# Patient Record
Sex: MALE | Employment: OTHER | URBAN - METROPOLITAN AREA
[De-identification: names, ages, dates, MRNs, and addresses within clinical notes are randomized per-mention and may not be internally consistent; named-entity substitution may affect disease eponyms.]

---

## 2017-07-13 ENCOUNTER — OFFICE VISIT (OUTPATIENT)
Dept: FAMILY MEDICINE | Facility: CLINIC | Age: 62
End: 2017-07-13

## 2017-07-13 VITALS
OXYGEN SATURATION: 99 % | SYSTOLIC BLOOD PRESSURE: 125 MMHG | HEIGHT: 69 IN | HEART RATE: 55 BPM | DIASTOLIC BLOOD PRESSURE: 80 MMHG | WEIGHT: 167.04 LBS | BODY MASS INDEX: 24.74 KG/M2 | TEMPERATURE: 97.4 F

## 2017-07-13 DIAGNOSIS — Z12.5 SCREENING FOR PROSTATE CANCER: ICD-10-CM

## 2017-07-13 DIAGNOSIS — E78.5 HYPERLIPIDEMIA LDL GOAL <100: ICD-10-CM

## 2017-07-13 DIAGNOSIS — Z12.11 SCREENING FOR COLON CANCER: ICD-10-CM

## 2017-07-13 DIAGNOSIS — M67.472 GANGLION CYST OF LEFT FOOT: Primary | ICD-10-CM

## 2017-07-13 LAB
ALT SERPL W P-5'-P-CCNC: 24 U/L (ref 0–70)
AST SERPL W P-5'-P-CCNC: 16 U/L (ref 0–45)
CHOLEST SERPL-MCNC: 176 MG/DL
GLUCOSE SERPL-MCNC: 95 MG/DL (ref 70–99)
HDLC SERPL-MCNC: 51 MG/DL
LDLC SERPL CALC-MCNC: 92 MG/DL
NONHDLC SERPL-MCNC: 125 MG/DL
PSA SERPL-ACNC: 3.52 UG/L (ref 0–4)
TRIGL SERPL-MCNC: 166 MG/DL

## 2017-07-13 ASSESSMENT — PAIN SCALES - GENERAL: PAINLEVEL: NO PAIN (0)

## 2017-07-13 NOTE — MR AVS SNAPSHOT
After Visit Summary   7/13/2017    Donald Torres    MRN: [id number]           Patient Information     Date Of Birth          1955        Visit Information        Provider Department      7/13/2017 11:00 AM Calderon Wilkinson MD [facility]        Today's Diagnoses     Ganglion cyst of left foot    -  1    Hyperlipidemia LDL goal <100        Screening for colon cancer        Screening for prostate cancer           Follow-ups after your visit        Future tests that were ordered for you today     Open Future Orders        Priority Expected Expires Ordered    Fecal colorectal cancer screen FIT Routine 8/3/2017 10/5/2017 7/13/2017            Who to contact     Please call your clinic at [phone] to:    Ask questions about your health    Make or cancel appointments    Discuss your medicines    Learn about your test results    Speak to your doctor   If you have compliments or concerns about an experience at your clinic, or if you wish to file a complaint, please contact [facility] Patient Relations at [phone] or email us at [email or web address]         Additional Information About Your Visit        MyChart Information     [employer] gives you secure access to your electronic health record. If you see a primary care provider, you can also send messages to your care team and make appointments. If you have questions, please call your primary care clinic.  If you do not have a primary care provider, please call [phone] and they will assist you.      [employer] is an electronic gateway that provides easy, online access to your medical records. With [employer], you can request a clinic appointment, read your test results, renew a prescription or communicate with your care team.     To access your existing account, please contact your [facility] or call [phone] for assistance.        Care EveryWhere ID     This is your  "Care EveryWhere ID. This could be used by other organizations to access your [place] medical records  [id number]        Your Vitals Were     Pulse Temperature Height Pulse Oximetry BMI (Body Mass Index)       55 97.4  F (36.3  C) 5' 8.5\" (174 cm) 99% 25.03 kg/m2        Blood Pressure from Last 3 Encounters:   07/13/17 125/80   02/25/16 125/79   10/01/15 122/76    Weight from Last 3 Encounters:   07/13/17 167 lb 0.6 oz (75.8 kg)   02/25/16 172 lb (78 kg)   10/01/15 168 lb (76.2 kg)              We Performed the Following     Lipid Panel Plus ([place])        Primary Care Provider Office Phone # Fax #    Calderon Wilkinson [phone]       [address] [address]        Equal Access to Services     SENAIT MURRIETA : Hadii aad ku hadasho Sobrianna, waaxda luqadaha, qaybta kaalmada adeegyada, yanira britoin mojgan mckenzie . So [facility] [phone].    ATENCIÓN: Si habla español, tiene a kramer disposición servicios gratuitos de asistencia lingüística. Robby al [phone].    We comply with applicable federal civil rights laws and [place] laws. We do not discriminate on the basis of race, color, national origin, age, disability sex, sexual orientation or gender identity.            Thank you!     Thank you for choosing [facility]  for your care. Our goal is always to provide you with excellent care. Hearing back from our patients is one way we can continue to improve our services. Please take a few minutes to complete the written survey that you may receive in the mail after your visit with us. Thank you!             Your Updated Medication List - Protect others around you: Learn how to safely use, store and throw away your medicines at www.disposemymeds.org.          This list is accurate as of: 7/13/17 11:23 AM.  Always use your most recent med list.                   Brand Name Dispense Instructions for use Diagnosis    atorvastatin 10 MG tablet    " LIPITOR    90 tablet    Take 1 tablet (10 mg) by mouth At Bedtime    Hyperlipidemia LDL goal <100       guaiFENesin-codeine 100-10 MG/5ML Soln solution    ROBITUSSIN AC    180 mL    Take 5-10 mLs by mouth every 4 hours as needed for cough    Cough       MUCINEX ALLERGY PO           ZANTAC PO

## 2017-07-13 NOTE — NURSING NOTE
"62 year old  Chief Complaint   Patient presents with     Mass     report a bump underneath the pt's left foot x5-6 weeks.       Blood pressure 125/80, pulse 55, temperature 97.4  F (36.3  C), height 5' 8.5\" (174 cm), weight 167 lb 0.6 oz (75.8 kg), SpO2 99 %. Body mass index is 25.03 kg/(m^2).  Patient Active Problem List   Diagnosis     Hyperlipidemia LDL goal <100     Esophageal reflux     Post-nasal drainage       Wt Readings from Last 2 Encounters:   07/13/17 167 lb 0.6 oz (75.8 kg)   02/25/16 172 lb (78 kg)     BP Readings from Last 3 Encounters:   07/13/17 125/80   02/25/16 125/79   10/01/15 122/76         Current Outpatient Prescriptions   Medication     atorvastatin (LIPITOR) 10 MG tablet     Fexofenadine HCl (MUCINEX ALLERGY PO)     guaiFENesin-codeine (ROBITUSSIN AC) 100-10 MG/5ML SOLN     Ranitidine HCl (ZANTAC PO)     No current facility-administered medications for this visit.        Social History   Substance Use Topics     Smoking status: Never Smoker     Smokeless tobacco: Not on file     Alcohol use Yes      Comment: wine couple times per week       Health Maintenance Due   Topic Date Due     ADVANCE DIRECTIVE PLANNING Q5 YRS  04/29/1972     FLEX SIG Q5 YEARS  01/25/2015       No results found for: LILLIAM Colon CMA  July 13, 2017 11:04 AM  "

## 2017-07-19 NOTE — PROGRESS NOTES
CHIEF COMPLAINT:  Mass on foot.      HISTORY OF PRESENT ILLNESS:  Donald Torres is 62-year-old accompanied by his wife, Leslie, to today's visit.  He has noticed a small bump under his left foot that has been present for about 5 to 6 weeks.  The only reason he noticed it is that he was rubbing his foot as he often does.  He simply noticed it and it was not due to pain.  Indeed, since he discovered it, he has had absolutely no discomfort whatsoever.  It does not seem to be enlarging.  He is wearing sandals and walking and such footwear does not cause any discomfort.  He has had plantar fasciitis in the past and this does not feel like it.  He is not sure what it is and just wanted to bring it to my attention.      Since he is in, he would like to talk about getting colon cancer screening.  He is 62 years old and has never had a colonoscopy.  We weighed the pros and cons of doing that versus something like a FIT card.  For a number of reasons, he would like to go with the latter.  In addition, he is fasting and wonders he could go ahead and check a lipid panel today (he is on atorvastatin) and I am happy to do that.  In addition, we will check a PSA 50, a glucose, and liver function tests.  Active medical problems reviewed.  Current medications reviewed.    OBJECTIVE:  Josep is in absolutely no distress.  BP is 125/80 with a pulse of 55 and regular.  Temperature is 97.4.  He is 5 feet 8-1/2 inches in height and weighs 167 pounds with a BMI of 25.  O2 sats are 99% on room air.  Examination of the bottom of the left foot reveals a small cyst-like mass over one of the plantar flexor tendons.  It is mobile.  It is not composed of bone.  It feels very benign.  I cannot reproduce any pain by palpating it.      Labs are pending.    ASSESSMENT AND PLAN:    1.  Probable ganglion cyst on the bottom of the left foot.  It causes no pain.  At this point, we will simply watch and wait.  However, if it increases in size and/or  causes discomfort, he will simply let me know.  At that point, somewhat dependent on size, we can decide if we should go with an ultrasound to determine if it is solid or fluid or go right to an MRI to further characterize it.  I am not concerned at this point that it is a sarcoma given the various features described above.  However, we will simply be aware of this and we will follow up accordingly.  2.  FIT card given today for colon cancer screening purposes.  If positive, he will need to get a colonoscopy.  If negative, we will simply repeat in 1 year.    3.  Lipid panel plus obtained today for lipids, glucose, and liver function tests.  He will be notified of those results.    4.  PSA 50 drawn today for prostate cancer screening purposes.      He will call with any problems or questions.          Calderon Wilkinson MD    D:  07/13/2017 18:30 T:  07/18/2017 21:26  Document:  [id number] \DK\BR

## 2017-08-29 DIAGNOSIS — Z12.11 SCREENING FOR COLON CANCER: ICD-10-CM

## 2017-08-29 LAB — HEMOCCULT STL QL IA: NEGATIVE

## 2017-08-29 PROCEDURE — 82274 ASSAY TEST FOR BLOOD FECAL: CPT | Performed by: FAMILY MEDICINE

## 2017-12-15 DIAGNOSIS — E78.5 HYPERLIPIDEMIA LDL GOAL <100: ICD-10-CM

## 2017-12-15 RX ORDER — ATORVASTATIN CALCIUM 10 MG/1
10 TABLET, FILM COATED ORAL AT BEDTIME
Qty: 90 TABLET | Refills: 1 | Status: SHIPPED | OUTPATIENT
Start: 2017-12-15 | End: 2018-06-18

## 2018-06-18 DIAGNOSIS — E78.5 HYPERLIPIDEMIA LDL GOAL <100: ICD-10-CM

## 2018-06-18 RX ORDER — ATORVASTATIN CALCIUM 10 MG/1
10 TABLET, FILM COATED ORAL AT BEDTIME
Qty: 90 TABLET | Refills: 0 | Status: SHIPPED | OUTPATIENT
Start: 2018-06-18 | End: 2018-09-10

## 2018-06-18 NOTE — TELEPHONE ENCOUNTER
Medication is being filled for 1 time refill only due to:  Patient needs labs lipid panel plus. Future labs ordered yes. Patient needs to be seen because it has been more than one year since last visit. come July 13, 2018.    Fariha Cotter RN  June 18, 2018 3:17 PM

## 2018-09-10 DIAGNOSIS — E78.5 HYPERLIPIDEMIA LDL GOAL <100: ICD-10-CM

## 2018-09-10 RX ORDER — ATORVASTATIN CALCIUM 10 MG/1
10 TABLET, FILM COATED ORAL AT BEDTIME
Qty: 30 TABLET | Refills: 0 | Status: SHIPPED | OUTPATIENT
Start: 2018-09-10 | End: 2018-10-16

## 2018-09-10 NOTE — TELEPHONE ENCOUNTER
Last office visit: 07/13/2017, no future appointment. Needs follow-up for lab. and doctor visit.    Routing refill request to provider for review/approval because:  Felicitas given x1 and patient did not follow up, please advise  Labs not current:  Lipid panel plus, future placed   Patient needs to be seen because it has been more than 1 year since last office visit.    Fariha Cotter RN  September 10, 2018 11:53 AM

## 2018-10-16 DIAGNOSIS — E78.5 HYPERLIPIDEMIA LDL GOAL <100: ICD-10-CM

## 2018-10-16 RX ORDER — ATORVASTATIN CALCIUM 10 MG/1
10 TABLET, FILM COATED ORAL AT BEDTIME
Qty: 15 TABLET | Refills: 0 | Status: SHIPPED | OUTPATIENT
Start: 2018-10-16 | End: 2018-10-23 | Stop reason: DRUGHIGH

## 2018-10-16 NOTE — TELEPHONE ENCOUNTER
Last office visit:  07/13/2017, no future appointment.  Needs follow-up appointment.Final Refill.  Medication is being filled for 1 time refill only due to:  Patient needs labs lipid plus - already in chart. Patient needs to be seen because it has been more than one year since last visit.   Nelsy Rain RN  Care Coordinator  [facility]

## 2018-10-18 DIAGNOSIS — E78.5 HYPERLIPIDEMIA LDL GOAL <100: ICD-10-CM

## 2018-10-18 LAB
ALT SERPL W P-5'-P-CCNC: 27 U/L (ref 0–70)
AST SERPL W P-5'-P-CCNC: 17 U/L (ref 0–45)
CHOLEST SERPL-MCNC: 214 MG/DL
GLUCOSE SERPL-MCNC: 87 MG/DL (ref 70–99)
HDLC SERPL-MCNC: 61 MG/DL
LDLC SERPL CALC-MCNC: 126 MG/DL
NONHDLC SERPL-MCNC: 153 MG/DL
TRIGL SERPL-MCNC: 137 MG/DL

## 2018-10-23 DIAGNOSIS — E78.5 HYPERLIPIDEMIA LDL GOAL <100: ICD-10-CM

## 2018-10-23 RX ORDER — ATORVASTATIN CALCIUM 20 MG/1
20 TABLET, FILM COATED ORAL DAILY
Qty: 90 TABLET | Refills: 1 | Status: SHIPPED | OUTPATIENT
Start: 2018-10-23 | End: 2019-04-04

## 2018-10-23 NOTE — TELEPHONE ENCOUNTER
Per Jaja Hernandez,  Future lab placed and med changed to 20 mg atorvastatin,  Pt is to RTN for visit with Jaja in 2 months time and then also get repeat lipid panel then  \  Mycdeepted pt back all this info.     Fariha Cotter RN  October 23, 2018 9:41 AM

## 2018-10-29 ENCOUNTER — OFFICE VISIT (OUTPATIENT)
Dept: FAMILY MEDICINE | Facility: CLINIC | Age: 63
End: 2018-10-29
Payer: COMMERCIAL

## 2018-10-29 VITALS
TEMPERATURE: 97.5 F | SYSTOLIC BLOOD PRESSURE: 130 MMHG | HEIGHT: 68 IN | OXYGEN SATURATION: 99 % | BODY MASS INDEX: 25.27 KG/M2 | DIASTOLIC BLOOD PRESSURE: 77 MMHG | HEART RATE: 58 BPM | WEIGHT: 166.75 LBS

## 2018-10-29 DIAGNOSIS — I49.3 PVC'S (PREMATURE VENTRICULAR CONTRACTIONS): ICD-10-CM

## 2018-10-29 DIAGNOSIS — R00.2 PALPITATIONS: Primary | ICD-10-CM

## 2018-10-29 DIAGNOSIS — F51.02 ADJUSTMENT INSOMNIA: ICD-10-CM

## 2018-10-29 DIAGNOSIS — F40.248 FEAR OF PUBLIC SPEAKING: ICD-10-CM

## 2018-10-29 LAB
ANION GAP SERPL CALCULATED.3IONS-SCNC: 8 MMOL/L (ref 3–14)
BUN SERPL-MCNC: 13 MG/DL (ref 7–30)
CALCIUM SERPL-MCNC: 9.4 MG/DL (ref 8.5–10.1)
CHLORIDE SERPL-SCNC: 108 MMOL/L (ref 94–109)
CO2 SERPL-SCNC: 26 MMOL/L (ref 20–32)
CREAT SERPL-MCNC: 0.87 MG/DL (ref 0.66–1.25)
GFR SERPL CREATININE-BSD FRML MDRD: 88 ML/MIN/1.7M2
GLUCOSE SERPL-MCNC: 99 MG/DL (ref 70–99)
POTASSIUM SERPL-SCNC: 4.4 MMOL/L (ref 3.4–5.3)
SODIUM SERPL-SCNC: 142 MMOL/L (ref 133–144)
TSH SERPL DL<=0.005 MIU/L-ACNC: 1.85 MU/L (ref 0.4–4)

## 2018-10-29 RX ORDER — PROPRANOLOL HYDROCHLORIDE 10 MG/1
TABLET ORAL
Qty: 30 TABLET | Refills: 1 | Status: SHIPPED | OUTPATIENT
Start: 2018-10-29 | End: 2022-02-01

## 2018-10-29 RX ORDER — LORAZEPAM 0.5 MG/1
TABLET ORAL
Qty: 20 TABLET | Refills: 1 | Status: SHIPPED | OUTPATIENT
Start: 2018-10-29 | End: 2018-12-10

## 2018-10-29 NOTE — NURSING NOTE
"63 year old  Chief Complaint   Patient presents with     Heart Problem     pt reports noticing some irregular heart beats for the past few months       Blood pressure 130/77, pulse 58, temperature 97.5  F (36.4  C), temperature source Oral, height 5' 8.42\" (173.8 cm), weight 166 lb 12 oz (75.6 kg), SpO2 99 %. Body mass index is 25.04 kg/(m^2).  Patient Active Problem List   Diagnosis     Hyperlipidemia LDL goal <100     Esophageal reflux     Post-nasal drainage     Ganglion cyst of left foot       Wt Readings from Last 2 Encounters:   10/29/18 166 lb 12 oz (75.6 kg)   07/13/17 167 lb 0.6 oz (75.8 kg)     BP Readings from Last 3 Encounters:   10/29/18 130/77   07/13/17 125/80   02/25/16 125/79         Current Outpatient Prescriptions   Medication     ASPIRIN PO     atorvastatin (LIPITOR) 20 MG tablet     Calcium Carbonate (CALCIUM 600 PO)     doxylamine (UNISOM) 25 MG TABS tablet     Fexofenadine HCl (MUCINEX ALLERGY PO)     Glucosamine HCl (GLUCOSAMINE PO)     MELATONIN PO     Ranitidine HCl (ZANTAC PO)     guaiFENesin-codeine (ROBITUSSIN AC) 100-10 MG/5ML SOLN     No current facility-administered medications for this visit.        Social History   Substance Use Topics     Smoking status: Never Smoker     Smokeless tobacco: Not on file     Alcohol use Yes      Comment: wine couple times per week       Health Maintenance Due   Topic Date Due     HIV SCREEN (SYSTEM ASSIGNED)  04/29/1972     ADVANCE DIRECTIVE PLANNING Q5 YRS  04/29/2009     FLEX SIG Q5 YEARS  01/25/2015     PHQ-2 Q1 YR  07/13/2018     INFLUENZA VACCINE (1) 09/01/2018       No results found for: PAP      October 29, 2018 7:59 AM  "

## 2018-10-29 NOTE — PROGRESS NOTES
"CHIEF COMPLAINT:  Heart palpitations      HISTORY OF PRESENT ILLNESS: Donald Torres is a 63 year old male who presents with his wife for above. He reports that he has been having heart palpitations in the past several months. He describes feeling a \"pause\" in his heartbeat when laying down to go to bed. He also felt the palpitations recently while sitting on the couch, and he was feeling slightly SOB. His wife checked his pulse and reports that it was regularly irregular. He has a family history of CAD (uncles) and a fib (brother and sister), so the heart palpitations make him nervous. He reports that he has been more stressed recently, largely due to difficulty sleeping.  He rarely drinks coffee or other caffeine. Nonsmoker. He exercises regularly, and does not notice the palpitations during activity. He takes atorvastatin 20mg for hyperlipidemia. His last lipid panel was on 10/18/18, and his LDL was elevated at 126. He also takes aspirin 81mg daily. He has heartburn, which is well-controlled with ranitidine daily.    He reports that when he feels particularly anxious, he has some difficulty falling asleep. 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 He reports that he has been awake until 3am as a result. He is wondering if taking something like melatonin would help with his sleep. His wife reports that they have some stressful changes in their lives as well.      FAMILY, SOCIAL AND PAST SURGICAL HISTORIES:  Unchanged.       MEDICATIONS:  Carefully reviewed.       REVIEW OF SYSTEMS:  A 10-point review is negative except as otherwise noted.      OBJECTIVE:    GENERAL: Donald is in no distress.  Affect is upbeat.     VITAL SIGNS: /77  Pulse 58  Temp 97.5  F (36.4  C) (Oral)  Ht 5' 8.42\" (173.8 cm)  Wt 166 lb 12 oz (75.6 kg)  SpO2 99%  BMI 25.04 kg/m2  HEENT:  Head is normocephalic, atraumatic.  HEART:  Regular rate and rhythm without murmur.   EXTREMITIES: "  Ankles free of any edema.   SKIN:  Warm and dry.       LABORATORY DATA: Labs (BMP and TSH) pending  EKG: NSR without ectopy.  1 minute rhythm strip revealed one isolated PVC.      ASSESSMENT AND PLAN:   1. Heart palpitations: EKG with rhythm strip showed 1 PVC.  TSH and basic metabolic panel pending  2. Primary insomnia: Prescribed lorazepam 0.5mg, 1-2 at HS prn insomnia/anxiety  3. 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  4. We'll see how he does over the next 2-3 weeks.  If palpitations continue, then we'll obtain a 7-day Zio Patch.  5. Total time spent with patient and his wife: over 25 minutes.     Call with any problems or questions.          I, Priyanka Colvin, am serving as a scribe to document services personally performed by Dr. Calderon Wilkinson, based on data collection and the provider's statements to me.

## 2018-10-29 NOTE — MR AVS SNAPSHOT
"              After Visit Summary   10/29/2018    Donald Torres    MRN: [id number]           Patient Information     Date Of Birth          1955        Visit Information        Provider Department      10/29/2018 8:00 AM Calderon Wilkinson MD [facility]        Today's Diagnoses     Palpitations    -  1    Adjustment insomnia        Fear of public speaking        PVC's (premature ventricular contractions)           Follow-ups after your visit        Follow-up notes from your care team     Return if symptoms worsen or fail to improve.      Who to contact     Please call your clinic at [phone] to:    Ask questions about your health    Make or cancel appointments    Discuss your medicines    Learn about your test results    Speak to your doctor            Additional Information About Your Visit        [employer] Information     [employer] gives you secure access to your electronic health record. If you see a primary care provider, you can also send messages to your care team and make appointments. If you have questions, please call your primary care clinic.  If you do not have a primary care provider, please call [phone] and they will assist you.      [employer] is an electronic gateway that provides easy, online access to your medical records. With [employer], you can request a clinic appointment, read your test results, renew a prescription or communicate with your care team.     To access your existing account, please contact your [facility] or call [phone] for assistance.        Care EveryWhere ID     This is your Care EveryWhere ID. This could be used by other organizations to access your [place] medical records  [id number]        Your Vitals Were     Pulse Temperature Height Pulse Oximetry BMI (Body Mass Index)       58 97.5  F (36.4  C) (Oral) 5' 8.42\" (173.8 cm) 99% 25.04 kg/m2        Blood Pressure from Last 3 Encounters:   10/29/18 130/77   07/13/17 125/80 "   02/25/16 125/79    Weight from Last 3 Encounters:   10/29/18 166 lb 12 oz (75.6 kg)   07/13/17 167 lb 0.6 oz (75.8 kg)   02/25/16 172 lb (78 kg)              We Performed the Following     Basic metabolic panel     EKG 12-lead complete w/read - Clinics     EKG rhythm strip     TSH with free T4 reflex          Today's Medication Changes          These changes are accurate as of 10/29/18  6:50 PM.  If you have any questions, ask your nurse or doctor.               Start taking these medicines.        Dose/Directions    LORazepam 0.5 MG tablet   Commonly known as:  ATIVAN   Used for:  Adjustment insomnia        Take 1-2 po at hs prn insomnia   Quantity:  20 tablet   Refills:  1       propranolol 10 MG tablet   Commonly known as:  INDERAL   Used for:  Fear of public speaking        Take  1 po ~30-60 minutes prior to public speaking   Quantity:  30 tablet   Refills:  1            Where to get your medicines      These medications were sent to [id number] - [place], [place] - [address] AT [facility] [address]  [facility] [address], [place] [address]    Hours:  24-hours Phone:  [phone]     propranolol 10 MG tablet         Some of these will need a paper prescription and others can be bought over the counter.  Ask your nurse if you have questions.     Bring a paper prescription for each of these medications     LORazepam 0.5 MG tablet                Primary Care Provider Office Phone # Fax #    Calderon Wilkinson [phone]       [address] [place] [address]        Equal Access to Services     [facility] LIT AH: Hadii herber keys hadasho Soomaali, waaxda luqadaha, qaybta kaalmada adeegyada, waxay idinorman longo. So [facility] [phone].    ATENCIÓN: Si habla español, tiene a kramer disposición servicios gratuitos de asistencia lingüística. Llame al [phone].    We comply with applicable federal civil rights laws and [place] laws. We do not  discriminate on the basis of race, color, national origin, age, disability, sex, sexual orientation, or gender identity.            Thank you!     Thank you for choosing [facility]  for your care. Our goal is always to provide you with excellent care. Hearing back from our patients is one way we can continue to improve our services. Please take a few minutes to complete the written survey that you may receive in the mail after your visit with us. Thank you!             Your Updated Medication List - Protect others around you: Learn how to safely use, store and throw away your medicines at www.disposemymeds.org.          This list is accurate as of 10/29/18  6:50 PM.  Always use your most recent med list.                   Brand Name Dispense Instructions for use Diagnosis    ASPIRIN PO      Take 81 mg by mouth        atorvastatin 20 MG tablet    LIPITOR    90 tablet    Take 1 tablet (20 mg) by mouth daily    Hyperlipidemia LDL goal <100       CALCIUM 600 PO      Take 600 Units by mouth 2 times daily        doxylamine 25 MG Tabs tablet    UNISOM     Take 25 mg by mouth At Bedtime        GLUCOSAMINE PO           guaiFENesin-codeine 100-10 MG/5ML Soln solution    ROBITUSSIN AC    180 mL    Take 5-10 mLs by mouth every 4 hours as needed for cough    Cough       LORazepam 0.5 MG tablet    ATIVAN    20 tablet    Take 1-2 po at hs prn insomnia    Adjustment insomnia       MELATONIN PO      Take 3 mg by mouth At Bedtime        MUCINEX ALLERGY PO           propranolol 10 MG tablet    INDERAL    30 tablet    Take  1 po ~30-60 minutes prior to public speaking    Fear of public speaking       ZANTAC PO

## 2018-12-10 DIAGNOSIS — F51.02 ADJUSTMENT INSOMNIA: ICD-10-CM

## 2018-12-10 RX ORDER — LORAZEPAM 0.5 MG/1
TABLET ORAL
Qty: 20 TABLET | Refills: 1
Start: 2018-12-10 | End: 2019-02-07

## 2019-04-01 ENCOUNTER — MYC MEDICAL ADVICE (OUTPATIENT)
Dept: FAMILY MEDICINE | Facility: CLINIC | Age: 64
End: 2019-04-01

## 2019-04-01 DIAGNOSIS — F51.02 ADJUSTMENT INSOMNIA: ICD-10-CM

## 2019-04-01 RX ORDER — LORAZEPAM 0.5 MG/1
TABLET ORAL
Qty: 20 TABLET | Refills: 0
Start: 2019-04-01 | End: 2019-04-24

## 2019-04-01 NOTE — TELEPHONE ENCOUNTER
Last Office Visit: 10/29/18  Future [facility] Appointments: none  Medication last refilled: 2/8/19 #20 + 1 RF    Patient going on vacation for 1 month, will need this for the duration of his trip.     Routing refill request to provider for review/approval because: Drug not on the [facility] [place] protocol   Radha Fan RN  04/01/19  4:52 PM

## 2019-04-04 ENCOUNTER — MYC MEDICAL ADVICE (OUTPATIENT)
Dept: FAMILY MEDICINE | Facility: CLINIC | Age: 64
End: 2019-04-04

## 2019-04-04 DIAGNOSIS — E78.5 HYPERLIPIDEMIA LDL GOAL <100: ICD-10-CM

## 2019-04-04 RX ORDER — ATORVASTATIN CALCIUM 20 MG/1
20 TABLET, FILM COATED ORAL DAILY
Qty: 90 TABLET | Refills: 0 | Status: SHIPPED | OUTPATIENT
Start: 2019-04-04 | End: 2019-07-16

## 2019-04-04 NOTE — TELEPHONE ENCOUNTER
OK per Dr. Wilkinson.    Patient will be due for return for lipid panel, increased dosing after last lipid panel    Radha Fan RN  04/04/19  12:29 PM

## 2019-04-24 DIAGNOSIS — F51.02 ADJUSTMENT INSOMNIA: ICD-10-CM

## 2019-04-24 RX ORDER — LORAZEPAM 0.5 MG/1
TABLET ORAL
Qty: 8 TABLET | Refills: 0
Start: 2019-04-24 | End: 2019-07-16

## 2019-04-24 NOTE — TELEPHONE ENCOUNTER
Last refill 4/1/19 - pt in [place] and asking for 8 more tabs to be called to [facility] in [facility] per Dr Wilkinson.  Nelsy Rain RN  [facility]

## 2019-04-25 NOTE — TELEPHONE ENCOUNTER
Script for lorazepam phoned to [facility] [phone].  Nelsy Rain RN  [facility]

## 2019-04-29 ENCOUNTER — OFFICE VISIT (OUTPATIENT)
Dept: FAMILY MEDICINE | Facility: CLINIC | Age: 64
End: 2019-04-29
Payer: COMMERCIAL

## 2019-04-29 ENCOUNTER — TELEPHONE (OUTPATIENT)
Dept: FAMILY MEDICINE | Facility: CLINIC | Age: 64
End: 2019-04-29

## 2019-04-29 VITALS
OXYGEN SATURATION: 97 % | DIASTOLIC BLOOD PRESSURE: 81 MMHG | SYSTOLIC BLOOD PRESSURE: 143 MMHG | WEIGHT: 162.25 LBS | RESPIRATION RATE: 16 BRPM | BODY MASS INDEX: 24.36 KG/M2 | TEMPERATURE: 97.7 F | HEART RATE: 68 BPM

## 2019-04-29 DIAGNOSIS — G47.00 INSOMNIA, UNSPECIFIED TYPE: ICD-10-CM

## 2019-04-29 DIAGNOSIS — F41.9 ANXIETY DISORDER, UNSPECIFIED TYPE: Primary | ICD-10-CM

## 2019-04-29 RX ORDER — LORAZEPAM 0.5 MG/1
0.5 TABLET ORAL EVERY 8 HOURS PRN
Qty: 9 TABLET | Refills: 0 | Status: SHIPPED | OUTPATIENT
Start: 2019-04-29 | End: 2019-07-16

## 2019-04-29 RX ORDER — DOXEPIN HYDROCHLORIDE 10 MG/1
10 CAPSULE ORAL AT BEDTIME
Qty: 30 CAPSULE | Refills: 1 | Status: SHIPPED | OUTPATIENT
Start: 2019-04-29 | End: 2019-07-15

## 2019-04-29 ASSESSMENT — ANXIETY QUESTIONNAIRES
5. BEING SO RESTLESS THAT IT IS HARD TO SIT STILL: MORE THAN HALF THE DAYS
6. BECOMING EASILY ANNOYED OR IRRITABLE: MORE THAN HALF THE DAYS
1. FEELING NERVOUS, ANXIOUS, OR ON EDGE: NEARLY EVERY DAY
GAD7 TOTAL SCORE: 17
3. WORRYING TOO MUCH ABOUT DIFFERENT THINGS: MORE THAN HALF THE DAYS
7. FEELING AFRAID AS IF SOMETHING AWFUL MIGHT HAPPEN: MORE THAN HALF THE DAYS
2. NOT BEING ABLE TO STOP OR CONTROL WORRYING: NEARLY EVERY DAY
IF YOU CHECKED OFF ANY PROBLEMS ON THIS QUESTIONNAIRE, HOW DIFFICULT HAVE THESE PROBLEMS MADE IT FOR YOU TO DO YOUR WORK, TAKE CARE OF THINGS AT HOME, OR GET ALONG WITH OTHER PEOPLE: VERY DIFFICULT

## 2019-04-29 ASSESSMENT — PATIENT HEALTH QUESTIONNAIRE - PHQ9: 5. POOR APPETITE OR OVEREATING: NEARLY EVERY DAY

## 2019-04-29 NOTE — PROGRESS NOTES
"  SUBJECTIVE:   Donald Torres is a 64 year old male who presents to clinic today for a return visit.    Recent Psychiatric History:  - reports he has struggled with anxiety his entire life  - retired a month ago  - went on a 3 week road trip with his wife but noticed he has having a harder and harder time \"dealing with stuff\" and with sleep  - was prescribed Ativan for anxiety at bedtime about 6 months by PCP  - made a huge difference, cutting out restlessness and helped with sleep  - last dose of Ativan was the evening of the   - worsen anxiety and panic attacks since last night (the )    Previous Diagnoses:  None    Current Psychiatric Medications:  Lorazepam 0.5mg #20/month prescribed by PCP Dr. Wilkinson since 2018  Propranolol 10mg PRN for speaking anxiety  Usually takes 50mg diphenhydramine and melatonin at night as well    Previous Medications:  None    Depressive Symptoms:  Over the last 2 weeks, how often have you been bothered by any of the followin = Not at all  1 = Several Days  2= More than half the days  3= Nearly every day    Little interest or pleasure in doing things: 1  Feeling down, depressed, or hopeless: 2  Trouble falling or staying asleep, or sleeping too much: 3  Feeling tired or having little energy: 1  Poor appetite or overeatin  Feeling bad about yourself or that you are a failure or have let yourself or your family down: 2  Trouble concentrating on things, such as reading the newspaper or watching television: 1  Moving or speaking so slowly that other people could have noticed or being so figety or restless that you have been moving around a lot more than usual: 2  Thoughts that you would be better off dead or of hurting yourself: 0  Total score: 15    Anxiety Symptoms:    Each item scored 0-3 (0=\"not at all,\" 1=\"several days,\" 2=\"more than half the days,\" 3=\"nearly every day)  Over the last 2 weeks how often have you been bothered by the following problems?  1) " Feeling nervous, anxious, or on edge? 3  2) Not being able to stop or control worrying? 3  3) Worrying too much about different things? 2  4) Trouble relaxing? 3  5) Being so restless that it is hard to sit still? 2  6) Becoming easily annoyed or irritable? 2  7) Feeling afraid as if something awful might happen? 2  Total Score: 17    - Intrusive thoughts, images, urges: no  - Repetitive behaviors or mental rituals: no    - Are anxiety symptoms predominantly about one or more social situations in which the individual is exposed to possible scrutiny by others. Examples include social interactions (eg, having a conversation, meeting unfamiliar people), being observed (eg, eating or drinking), and performing in front of others (eg, giving a speech): no but does have increased anxiety around public speaking    - Is there a marked fear or anxiety about two (or more) of the following five situations:   1. Using public transportation (e.g., automobiles, buses, trains, ships, planes): no  2. Being in open spaces (e.g., parking lots, marketplaces, bridges): no  3. Being in enclosed places (e.g., shops, theaters, cinemas): no  4. Standing in line or being in a crowd: no  5. Being outside of the home alone: no    - Discrete brief episodes of anxiety concerning for panic attacks: yes but these are new as of last night    History of Trauma:    In your life, have you ever had an experience so horrible, frightening, or upsetting that, in the past month, you:   - Have had nightmares about it or thought about it when you did not want to? yes  - Tried hard not to think about it or went out of your way to avoid situations that reminded you of it? yes    Substance Use History:  Avoids all caffeine  Denies illicit substances    Family History:  Mother - significant anxiety  Daughter - anxiety  Sister - anxiety  Brother - anxiety-triggered migraines    ROS: Denies fevers, chills, chest pain, difficulty breathing, abdominal  pain    Patient Active Problem List   Diagnosis     Hyperlipidemia LDL goal <100     Esophageal reflux     Post-nasal drainage     Ganglion cyst of left foot     Fear of public speaking     Adjustment insomnia     Current Outpatient Medications   Medication     ASPIRIN PO     atorvastatin (LIPITOR) 20 MG tablet     doxylamine (UNISOM) 25 MG TABS tablet     Fexofenadine HCl (MUCINEX ALLERGY PO)     LORazepam (ATIVAN) 0.5 MG tablet     MELATONIN PO     Ranitidine HCl (ZANTAC PO)     Calcium Carbonate (CALCIUM 600 PO)     Glucosamine HCl (GLUCOSAMINE PO)     propranolol (INDERAL) 10 MG tablet     No current facility-administered medications for this visit.      I have reviewed the patient's relevant past medical history.     OBJECTIVE:   /81 (BP Location: Right arm, Patient Position: Sitting, Cuff Size: Adult Large)   Pulse 68   Temp 97.7  F (36.5  C) (Oral)   Resp 16   Wt 73.6 kg (162 lb 4 oz)   SpO2 97%   BMI 24.36 kg/m      Constitutional: well-appearing, appears stated age  Eyes: conjunctivae without erythema, sclera anicteric.   Skin: no rashes, lesions, or wounds  Psych: affect is full and appropriate, speech is fluent and non-pressured    CIWA-Ar 11 - points for nausea, self-reported anxiety, tongue fasciculations, moist palms    ASSESSMENT AND PLAN:     (F41.9) Anxiety disorder, unspecified type  (primary encounter diagnosis)  Comment: Suspect acute worsening of anxiety is related to withdrawal of long-term low dose lorazepam. I have prescribed a taper to use over the next 5 days using 0.25mg doses to get him off the lorazepam entirely. I have advised him that the best first line medication for his anxiety would be an serotonin specific reuptake inhibitor but he does not wish to take a medication from this class. Instead, we will start with doxepin because his primary concern is his ability to sleep so the sedation will be helpful. This can then be titrated if need be to high doses to use as an  anxiolytic. I have cautioned on the risk for side effects including dry mouth, constipation, urinary retention, and sedation. I have advised him not to take this medication with unisom/benadryl. I have asked that he call back with any worsening withdrawal symptoms and to return to the clinic in 1-2 weeks to meet with either me or his PCP.   They have asked for assistance with establishing with a therapist so I have reached out to our  Elaine to provide them with resources.  Plan: doxepin (SINEQUAN) 10 MG capsule, LORazepam         (ATIVAN) 0.5 MG tablet          (G47.00) Insomnia, unspecified type  Comment: see above   Plan: doxepin (SINEQUAN) 10 MG capsule    I spent a total of 40 minutes face-to-face with Donald Torres during today s office visit. Over 50% of this time was spent counseling the patient and/or coordinating care regarding anxiety.            Robert Galindo   [facility]  04/29/2019, 3:49 PM

## 2019-04-29 NOTE — NURSING NOTE
64 year old  Chief Complaint   Patient presents with     Anxiety     panic attacks, sweating feeling of doom       Blood pressure 143/81, pulse 68, temperature 97.7  F (36.5  C), temperature source Oral, resp. rate 16, weight 73.6 kg (162 lb 4 oz), SpO2 97 %. Body mass index is 24.36 kg/m .  Patient Active Problem List   Diagnosis     Hyperlipidemia LDL goal <100     Esophageal reflux     Post-nasal drainage     Ganglion cyst of left foot     Fear of public speaking     Adjustment insomnia       Wt Readings from Last 2 Encounters:   04/29/19 73.6 kg (162 lb 4 oz)   10/29/18 75.6 kg (166 lb 12 oz)     BP Readings from Last 3 Encounters:   04/29/19 143/81   10/29/18 130/77   07/13/17 125/80         Current Outpatient Medications   Medication     ASPIRIN PO     atorvastatin (LIPITOR) 20 MG tablet     doxylamine (UNISOM) 25 MG TABS tablet     Fexofenadine HCl (MUCINEX ALLERGY PO)     LORazepam (ATIVAN) 0.5 MG tablet     MELATONIN PO     Ranitidine HCl (ZANTAC PO)     Calcium Carbonate (CALCIUM 600 PO)     Glucosamine HCl (GLUCOSAMINE PO)     propranolol (INDERAL) 10 MG tablet     No current facility-administered medications for this visit.        Social History     Tobacco Use     Smoking status: Never Smoker     Smokeless tobacco: Never Used   Substance Use Topics     Alcohol use: Yes     Comment: wine couple times per week     Drug use: No       Health Maintenance Due   Topic Date Due     HIV SCREEN (SYSTEM ASSIGNED)  04/29/1972     ZOSTER IMMUNIZATION (1 of 2) 04/29/2004     ADVANCE DIRECTIVE PLANNING Q5 YRS  04/29/2009     FLEX SIG Q5 YEARS  01/25/2015     INFLUENZA VACCINE (1) 09/01/2018     PHQ-2  01/01/2019     AORTIC ANEURYSM SCREENING (SYSTEM ASSIGNED)  04/29/2019     MEDICARE ANNUAL WELLNESS VISIT  04/29/2019     FALL RISK ASSESSMENT  04/29/2019     PNEUMOCOCCAL IMMUNIZATION 65+ LOW/MEDIUM RISK (1 of 2 - PCV13) 04/29/2019       No results found for: PAP      April 29, 2019 3:24 PM

## 2019-04-29 NOTE — TELEPHONE ENCOUNTER
[facility] Call Center    Phone Message    May a detailed message be left on voicemail: yes    Reason for Call: Other: The pt's wife called back stating she needs to have a clinic nurse call her back lopez Car's My Chart messages, his withdrawal from ativan, need for therapy, need to get in to Dr Wilkinson, etc. Please call the pt asap at [phone]. Thanks.      Action Taken: Message routed to:  [facility]: [place]

## 2019-04-29 NOTE — TELEPHONE ENCOUNTER
Patient states he was started on Ativan for heart palpitations related to anxiety about 3 months ago. The Ativan has been helpful.     Patient is now home from [place] and states he now has panic attacks, feels sweaty, difficulty sleeping. He does not want to take the Ativan, but does not know how to manage his symptoms. He denies having chest pain. Admits to some problem with breathing due to the anxiety he feels.     Patient agrees to come in to clinic today.     Radha Fan RN  04/29/19  2:44 PM

## 2019-04-29 NOTE — PATIENT INSTRUCTIONS
Start by taking a full 0.5mg tablet as soon as possible  If after 30 minutes, you're not starting to feel better, take another 0.5mg tablet    At bedtime tonight, take two 0.5mg tablets    4/30 Take one 0.5mg tablet in the morning and one at bedtime. You may take another tablet mid-day if needed.  5/1 Take one half tablet in the morning and one full tablet at bedtime. You may take another half tablet mid-day if needed.  5/2 Don't take anything in the morning. Take one full tablet at bedtime. You may take another half tablet mid-day if needed  5/3 Take one half tablet at bedtime. You may take another half tablet at any time if needed.   5/4 Take one half tablet at bedtime. You may take another half tablet at any time if needed.   5/5 You may take another half tablet at any time if needed

## 2019-04-30 ASSESSMENT — PATIENT HEALTH QUESTIONNAIRE - PHQ9: SUM OF ALL RESPONSES TO PHQ QUESTIONS 1-9: 13

## 2019-05-01 ASSESSMENT — ANXIETY QUESTIONNAIRES: GAD7 TOTAL SCORE: 17

## 2019-05-03 NOTE — PROGRESS NOTES
"  SUBJECTIVE:   Donald Torres is a 64 year old male who presents to clinic today for a return visit.    # Anxiety Disorder, unspecified  - first seen by me for this problem on 19  - at that visit, there was concern for acute worsening of anxiety symptoms related to withdrawal from his chronic nightly lorazepam and he was started on a lorazepam taper plan over 5 days  - he was additionally started on doxepin 10mg nightly to assist with insomnia after he declined ssri treatment for his anxiety  - \"things during the day have been going relatively well\"  - still a little irritable  - first 3-4 days of taking doxepin, he was asleep after 5 minutes  - since then, has noticed worsening insomnia. Last night wasn't able to go to sleep until close to midnight, hours after going to sleep  - laying in bed, would feel anxiety \"come and go\"    Generalized Anxiety Disorder 7-Item Scale  Each item scored 0-3 (0=\"not at all,\" 1=\"several days,\" 2=\"more than half the days,\" 3=\"nearly every day)    Over the last 2 weeks how often have you been bothered by the following problems?  1) Feeling nervous, anxious, or on edge? 1  2) Not being able to stop or control worrying? 1  3) Worrying too much about different things? 1  4) Trouble relaxing? 2  5) Being so restless that it is hard to sit still? 1  6) Becoming easily annoyed or irritable? 2  7) Feeling afraid as if something awful might happen? 1  Total Score: 9    RADHA-7 SCORE 2019   Total Score 17       Patient Health Questionnaire  Over the last 2 weeks, how often have you been bothered by any of the followin = Not at all  1 = Several Days  2= More than half the days  3= Nearly every day    Little interest or pleasure in doing things: 0  Feeling down, depressed, or hopeless: 1  Trouble falling or staying asleep, or sleeping too much: 2  Feeling tired or having little energy: 1  Poor appetite or overeatin  Feeling bad about yourself or that you are a failure or have " let yourself or your family down: 0  Trouble concentrating on things, such as reading the newspaper or watching television: 1  Moving or speaking so slowly that other people could have noticed or being so fidgety or restless that you have been moving around a lot more than usual: 1  Thoughts that you would be better off dead or of hurting yourself: 0  Total Score: 6    PHQ-9 SCORE 4/29/2019   PHQ-9 Total Score 13       # Cough  - cough and congestion for 9 days  - mostly in morning and late afternoon/evening  - no dyspnea  - no fevers/chills  - sore throat initially but no longer    ROS: Denies fevers, chills, chest pain, difficulty breathing, abdominal pain    Patient Active Problem List   Diagnosis     Hyperlipidemia LDL goal <100     Esophageal reflux     Post-nasal drainage     Ganglion cyst of left foot     Fear of public speaking     Adjustment insomnia     Current Outpatient Medications   Medication     ASPIRIN PO     atorvastatin (LIPITOR) 20 MG tablet     Calcium Carbonate (CALCIUM 600 PO)     doxepin (SINEQUAN) 10 MG capsule     doxylamine (UNISOM) 25 MG TABS tablet     Fexofenadine HCl (MUCINEX ALLERGY PO)     Glucosamine HCl (GLUCOSAMINE PO)     LORazepam (ATIVAN) 0.5 MG tablet     LORazepam (ATIVAN) 0.5 MG tablet     MELATONIN PO     Ranitidine HCl (ZANTAC PO)     propranolol (INDERAL) 10 MG tablet     No current facility-administered medications for this visit.      I have reviewed the patient's relevant past medical history.     OBJECTIVE:   /77 (BP Location: Right arm, Patient Position: Sitting, Cuff Size: Adult Regular)   Pulse 65   Temp 97.7  F (36.5  C) (Oral)   Wt 74.7 kg (164 lb 12 oz)   SpO2 98%   BMI 24.74 kg/m      Constitutional: well-appearing, appears stated age  Eyes: conjunctivae without erythema, sclera anicteric.   Cardiac: regular rate and rhythm, normal S1/S2, no murmur/rubs/gallops  Respiratory: lungs clear to auscultation bilaterally, normal work of breathing, no  wheezes/crackles  Skin: no rashes, lesions, or wounds  Psych: affect is full and appropriate, speech is fluent and non-pressured      ASSESSMENT AND PLAN:     (F41.9) Anxiety disorder, unspecified type  (primary encounter diagnosis)  Comment: Improving. Off of ativan now. Will continue same dose of doxepin for next week. I have asked him to message me if continuing to have difficulty sleeping and would discuss increasing doxepin to 20mg daily to start to get to anxiety-controlling doses. Reviewed sleep hygiene with him and recommended CBT-i phone trang. He is planning on scheduling with therapy after reviewing our RICHARD Henry's recommendations/resources.  Plan:     (R05) Cough  Comment: Viral URI vs allergic rhinitis. Encouraged trial of Flonase or other nasal steroid. Normal lung exam, afebrile, no dyspnea - no suspicion for pneumonia. Call if not improving over next week.   Plan:     Robert Galindo   [facility]  05/06/2019, 8:24 AM

## 2019-05-06 ENCOUNTER — OFFICE VISIT (OUTPATIENT)
Dept: FAMILY MEDICINE | Facility: CLINIC | Age: 64
End: 2019-05-06
Payer: COMMERCIAL

## 2019-05-06 VITALS
SYSTOLIC BLOOD PRESSURE: 125 MMHG | TEMPERATURE: 97.7 F | WEIGHT: 164.75 LBS | DIASTOLIC BLOOD PRESSURE: 77 MMHG | BODY MASS INDEX: 24.74 KG/M2 | OXYGEN SATURATION: 98 % | HEART RATE: 65 BPM

## 2019-05-06 DIAGNOSIS — R05.9 COUGH: ICD-10-CM

## 2019-05-06 DIAGNOSIS — F41.9 ANXIETY DISORDER, UNSPECIFIED TYPE: Primary | ICD-10-CM

## 2019-05-06 NOTE — NURSING NOTE
64 year old  Chief Complaint   Patient presents with     Recheck Medication     follow up for ativan     Nose Problem     and cough, since 9 days       Blood pressure 125/77, pulse 65, temperature 97.7  F (36.5  C), temperature source Oral, weight 74.7 kg (164 lb 12 oz), SpO2 98 %. Body mass index is 24.74 kg/m .  Patient Active Problem List   Diagnosis     Hyperlipidemia LDL goal <100     Esophageal reflux     Post-nasal drainage     Ganglion cyst of left foot     Fear of public speaking     Adjustment insomnia       Wt Readings from Last 2 Encounters:   05/06/19 74.7 kg (164 lb 12 oz)   04/29/19 73.6 kg (162 lb 4 oz)     BP Readings from Last 3 Encounters:   05/06/19 125/77   04/29/19 143/81   10/29/18 130/77         Current Outpatient Medications   Medication     ASPIRIN PO     atorvastatin (LIPITOR) 20 MG tablet     Calcium Carbonate (CALCIUM 600 PO)     doxepin (SINEQUAN) 10 MG capsule     doxylamine (UNISOM) 25 MG TABS tablet     Fexofenadine HCl (MUCINEX ALLERGY PO)     Glucosamine HCl (GLUCOSAMINE PO)     LORazepam (ATIVAN) 0.5 MG tablet     LORazepam (ATIVAN) 0.5 MG tablet     MELATONIN PO     Ranitidine HCl (ZANTAC PO)     propranolol (INDERAL) 10 MG tablet     No current facility-administered medications for this visit.        Social History     Tobacco Use     Smoking status: Never Smoker     Smokeless tobacco: Never Used   Substance Use Topics     Alcohol use: Yes     Comment: wine couple times per week     Drug use: No       Health Maintenance Due   Topic Date Due     HIV SCREEN (SYSTEM ASSIGNED)  04/29/1972     ZOSTER IMMUNIZATION (1 of 2) 04/29/2004     ADVANCE DIRECTIVE PLANNING Q5 YRS  04/29/2009     FLEX SIG Q5 YEARS  01/25/2015     PNEUMOCOCCAL IMMUNIZATION 65+ LOW/MEDIUM RISK (1 of 2 - PCV13) 04/29/2019     AORTIC ANEURYSM SCREENING (SYSTEM ASSIGNED)  04/29/2019     MEDICARE ANNUAL WELLNESS VISIT  04/29/2019     FALL RISK ASSESSMENT  04/29/2019       No results found for: PAP      May 6, 2019  8:23 AM

## 2019-05-07 ASSESSMENT — PATIENT HEALTH QUESTIONNAIRE - PHQ9
SUM OF ALL RESPONSES TO PHQ QUESTIONS 1-9: 6
5. POOR APPETITE OR OVEREATING: MORE THAN HALF THE DAYS

## 2019-05-07 ASSESSMENT — ANXIETY QUESTIONNAIRES
1. FEELING NERVOUS, ANXIOUS, OR ON EDGE: SEVERAL DAYS
2. NOT BEING ABLE TO STOP OR CONTROL WORRYING: SEVERAL DAYS
GAD7 TOTAL SCORE: 9
6. BECOMING EASILY ANNOYED OR IRRITABLE: MORE THAN HALF THE DAYS
3. WORRYING TOO MUCH ABOUT DIFFERENT THINGS: SEVERAL DAYS
5. BEING SO RESTLESS THAT IT IS HARD TO SIT STILL: SEVERAL DAYS
IF YOU CHECKED OFF ANY PROBLEMS ON THIS QUESTIONNAIRE, HOW DIFFICULT HAVE THESE PROBLEMS MADE IT FOR YOU TO DO YOUR WORK, TAKE CARE OF THINGS AT HOME, OR GET ALONG WITH OTHER PEOPLE: SOMEWHAT DIFFICULT
7. FEELING AFRAID AS IF SOMETHING AWFUL MIGHT HAPPEN: SEVERAL DAYS

## 2019-05-08 ASSESSMENT — ANXIETY QUESTIONNAIRES: GAD7 TOTAL SCORE: 9

## 2019-05-22 DIAGNOSIS — E78.5 HYPERLIPIDEMIA LDL GOAL <100: ICD-10-CM

## 2019-05-22 RX ORDER — ATORVASTATIN CALCIUM 20 MG/1
20 TABLET, FILM COATED ORAL DAILY
Qty: 90 TABLET | Refills: 0 | Status: CANCELLED | OUTPATIENT
Start: 2019-05-22

## 2019-05-22 NOTE — TELEPHONE ENCOUNTER
Last time prescribed: 4/4/19 , 90 tabs/caps x 0 refills - too soon to refill  Last office visit: 5/6/19  Next appointment: 7/15/19  Last labs: 10/18/18 - increased dose at that time    Pharmacy informed - too soon for refill    Radha Fan RN  05/22/19  5:11 PM

## 2019-07-15 ENCOUNTER — MYC MEDICAL ADVICE (OUTPATIENT)
Dept: FAMILY MEDICINE | Facility: CLINIC | Age: 64
End: 2019-07-15

## 2019-07-15 ENCOUNTER — OFFICE VISIT (OUTPATIENT)
Dept: FAMILY MEDICINE | Facility: CLINIC | Age: 64
End: 2019-07-15
Payer: COMMERCIAL

## 2019-07-15 VITALS
BODY MASS INDEX: 25.1 KG/M2 | HEIGHT: 69 IN | HEART RATE: 55 BPM | TEMPERATURE: 97.5 F | WEIGHT: 169.5 LBS | OXYGEN SATURATION: 99 % | DIASTOLIC BLOOD PRESSURE: 75 MMHG | SYSTOLIC BLOOD PRESSURE: 119 MMHG

## 2019-07-15 DIAGNOSIS — Z23 NEED FOR PNEUMOCOCCAL VACCINATION: ICD-10-CM

## 2019-07-15 DIAGNOSIS — E78.5 HYPERLIPIDEMIA LDL GOAL <100: ICD-10-CM

## 2019-07-15 DIAGNOSIS — Z13.1 SCREENING FOR DIABETES MELLITUS: ICD-10-CM

## 2019-07-15 DIAGNOSIS — Z00.00 WELCOME TO MEDICARE PREVENTIVE VISIT: Primary | ICD-10-CM

## 2019-07-15 DIAGNOSIS — G47.00 INSOMNIA, UNSPECIFIED TYPE: ICD-10-CM

## 2019-07-15 DIAGNOSIS — Z12.5 SCREENING FOR PROSTATE CANCER: ICD-10-CM

## 2019-07-15 LAB
ALT SERPL-CCNC: 33 U/L (ref 0–70)
AST SERPL-CCNC: 25 U/L (ref 0–55)
CHOLEST SERPL-MCNC: 177 MG/DL (ref 0–200)
CHOLEST/HDLC SERPL: 2.8 {RATIO} (ref 0–5)
FASTING SPECIMEN: YES
GLUCOSE SERPL-MCNC: 101 MG/DL (ref 60–109)
HDLC SERPL-MCNC: 64 MG/DL
LDLC SERPL CALC-MCNC: 83 MG/DL (ref 0–129)
PSA SERPL-ACNC: 4.27 UG/L (ref 0–4)
TRIGL SERPL-MCNC: 149 MG/DL (ref 0–150)
VLDL-CHOLESTEROL: 30 (ref 7–32)

## 2019-07-15 RX ORDER — TRAZODONE HYDROCHLORIDE 50 MG/1
50 TABLET, FILM COATED ORAL AT BEDTIME
Qty: 90 TABLET | Refills: 4 | Status: SHIPPED | OUTPATIENT
Start: 2019-07-15 | End: 2021-01-19

## 2019-07-15 ASSESSMENT — MIFFLIN-ST. JEOR: SCORE: 1536.35

## 2019-07-15 NOTE — NURSING NOTE
"64 year old  Chief Complaint   Patient presents with     Physical     Sleep Problem     sleeping issue     Foot Problems     plantar fasciitis in left foot       Blood pressure 119/75, pulse 55, temperature 97.5  F (36.4  C), temperature source Oral, height 1.74 m (5' 8.5\"), weight 76.9 kg (169 lb 8 oz), SpO2 99 %. Body mass index is 25.39 kg/m .  Patient Active Problem List   Diagnosis     Hyperlipidemia LDL goal <100     Esophageal reflux     Post-nasal drainage     Ganglion cyst of left foot     Fear of public speaking     Adjustment insomnia       Wt Readings from Last 2 Encounters:   07/15/19 76.9 kg (169 lb 8 oz)   05/06/19 74.7 kg (164 lb 12 oz)     BP Readings from Last 3 Encounters:   07/15/19 119/75   05/06/19 125/77   04/29/19 143/81         Current Outpatient Medications   Medication     ASPIRIN PO     atorvastatin (LIPITOR) 20 MG tablet     doxepin (SINEQUAN) 10 MG capsule     Fexofenadine HCl (MUCINEX ALLERGY PO)     Glucosamine HCl (GLUCOSAMINE PO)     MELATONIN PO     propranolol (INDERAL) 10 MG tablet     Ranitidine HCl (ZANTAC PO)     Calcium Carbonate (CALCIUM 600 PO)     doxylamine (UNISOM) 25 MG TABS tablet     LORazepam (ATIVAN) 0.5 MG tablet     LORazepam (ATIVAN) 0.5 MG tablet     No current facility-administered medications for this visit.        Social History     Tobacco Use     Smoking status: Never Smoker     Smokeless tobacco: Never Used   Substance Use Topics     Alcohol use: Yes     Comment: wine couple times per week     Drug use: No       Health Maintenance Due   Topic Date Due     ADVANCE CARE PLANNING  1955     HIV SCREENING  04/29/1969     ZOSTER IMMUNIZATION (1 of 2) 04/29/2004     FLEX SIG  01/25/2015     MEDICARE ANNUAL WELLNESS VISIT  04/29/2019     PNEUMOCOCCAL IMMUNIZATION 65+ LOW/MEDIUM RISK (1 of 2 - PCV13) 04/29/2019     AORTIC ANEURYSM SCREENING (SYSTEM ASSIGNED)  04/29/2019       No results found for: PAP      July 15, 2019 8:34 AM    "

## 2019-07-15 NOTE — PROGRESS NOTES
"CHIEF COMPLAINT: Welcome to medicare wellness visit      HISTORY OF PRESENT ILLNESS: Donald Torres is a 64 year old male who presents for his first medicare wellness visit. He retired this past March and is planning to visit his daughter in [place], [place] in a couple weeks.     Josep has a history of anxiety and has had two sessions of counseling since I last saw him. He is currently taking doxepin 10 mg and melatonin at bedtime, mild improvement. He notes he became \"addicted\" to lorazepam, so he is no longer taking that. He states he is very anxious at night and his wife snores. Because of this, he has tried Zhane ear plugs that work fairly well. Also, his wife has noticed he has been more forgetful and is wondering if it related to the doxepin.     Josep also believes he has plantar fascitis in his left foot. It bothers him the first five minutes in the morning when he wakes up, otherwise no pain during the day.       FAMILY, SOCIAL AND PAST SURGICAL HISTORIES:  Unchanged.       MEDICATIONS:  Carefully reviewed.       HEALTHCARE MAINTENANCE:    Health Maintenance   Topic Date Due     ADVANCE CARE PLANNING  1955     HIV SCREENING  04/29/1969     ZOSTER IMMUNIZATION (1 of 2) 04/29/2004     FLEX SIG  01/25/2015     MEDICARE ANNUAL WELLNESS VISIT  04/29/2019     PNEUMOCOCCAL IMMUNIZATION 65+ LOW/MEDIUM RISK (1 of 2 - PCV13) 04/29/2019     AORTIC ANEURYSM SCREENING (SYSTEM ASSIGNED)  04/29/2019     INFLUENZA VACCINE (1) 09/01/2019     FALL RISK ASSESSMENT  07/15/2020     LIPID  10/18/2023     DTAP/TDAP/TD IMMUNIZATION (5 - Td) 11/11/2026     HEPATITIS C SCREENING  Completed     PHQ-2  Completed     IPV IMMUNIZATION  Aged Out     MENINGITIS IMMUNIZATION  Aged Out       Eye exam: Wears glasses, up to date. No glaucoma or MD, mild cataracts.  Hearing: No concerns, but he noticed tinnitus in his right ear at night  Dental: Up to date  BP: 119/75  BMI: Near perfect  Immunizations: Due for PCV-13 and Shingrix vaccine " "  Colonoscopy: Reports Cologuard testing in 2017, normal   EKG: Done in October of 2018, normal    Medicare Preventive Visit:   1. No problems with ADLs  2. No falls  3. No depression  4. Mild concerns about memory loss, possibly related to insomnia.       REVIEW OF SYSTEMS:  A 10-point review is negative.       OBJECTIVE:    GENERAL: Donald Torres is in no distress.  Affect is upbeat.   Alert and oriented x3  VITAL SIGNS: /75 (BP Location: Left arm, Patient Position: Sitting, Cuff Size: Adult Large)   Pulse 55   Temp 97.5  F (36.4  C) (Oral)   Ht 1.74 m (5' 8.5\")   Wt 76.9 kg (169 lb 8 oz)   SpO2 99%   BMI 25.39 kg/m    HEENT:  Head is normocephalic, atraumatic. Ears clear bilaterally. No pain with palpation of the frontal or maxillary sinuses.  Eyes are grossly normal.  Nose is free of any congestion or discharge.  Teeth in good repair.  Mucous membranes are moist.  Throat looks benign.  Neck is supple without adenopathy or thyromegaly.  No carotid bruits are heard, no JVD.   BACK:  Smooth and straight.  No pain to percussion.  No CVA tenderness.   LUNGS:  Clear to auscultation bilaterally.   HEART:  Regular rate and rhythm without murmur.   ABDOMEN:  Benign.     EXTREMITIES:  Ankles free of any edema.   SKIN:  Warm and dry.       LABORATORY DATA: Labs pending      ASSESSMENT AND PLAN:   1. Welcome to medicare wellness visit  2. HCM: PCV-13 given. Discussed Shingrix vaccine when available.   3. Routine screening: PSA, lipid panel, glucose, pending  4. Colon cancer screening: Patient recalls Cologuard testing done in 2017. Will repeat screening in 2020.   5. Insomnia: Discontinue doxepin, start trazodone 50 mg at night  6. Plantar fascitis, left foot: Recommended foot strengthening with a hand towel. Can refer to physical therapy if needed.     Call with any problems or questions.       Gray MARTINEZ, am serving as a scribe to document services personally performed by Dr. Calderon Wilkinson, based on " data collection and the provider's statements to me. Dr. Wilkinson has reviewed, edited, and approved the above note.     --Calderon Wilkinson MD

## 2019-07-15 NOTE — PROGRESS NOTES
Screening Questionnaire for Adult Immunization    Are you sick today?   No   Do you have allergies to medications, food, a vaccine component or latex?   No   Have you ever had a serious reaction after receiving a vaccination?   No   Do you have a long-term health problem with heart disease, lung disease, asthma, kidney disease, metabolic disease (e.g. diabetes), anemia, or other blood disorder?   No   Do you have cancer, leukemia, HIV/AIDS, or any other immune system problem?   No   In the past 3 months, have you taken medications that affect  your immune system, such as prednisone, other steroids, or anticancer drugs; drugs for the treatment of rheumatoid arthritis, Crohn s disease, or psoriasis; or have you had radiation treatments?   No   Have you had a seizure, or a brain or other nervous system problem?   No   During the past year, have you received a transfusion of blood or blood     products, or been given immune (gamma) globulin or antiviral drug?   No   For women: Are you pregnant or is there a chance you could become        pregnant during the next month?   No   Have you received any vaccinations in the past 4 weeks?   No     Immunization questionnaire answers were all negative.        Per orders of Dr. Calderon Wilkinson, injection of Prevnar 13 given by Chris Enciso. Patient instructed to remain in clinic for 15 minutes afterwards, and to report any adverse reaction to me immediately.       Screening performed by Chris Enciso on 7/15/2019 at 9:46 AM.

## 2019-07-16 RX ORDER — ATORVASTATIN CALCIUM 20 MG/1
20 TABLET, FILM COATED ORAL DAILY
Qty: 90 TABLET | Refills: 4 | Status: SHIPPED | OUTPATIENT
Start: 2019-07-16 | End: 2020-07-30

## 2019-09-06 DIAGNOSIS — B35.6 JOCK ITCH: Primary | ICD-10-CM

## 2019-09-06 RX ORDER — PRENATAL VIT 91/IRON/FOLIC/DHA 28-975-200
COMBINATION PACKAGE (EA) ORAL 2 TIMES DAILY
Qty: 30 G | Refills: 0 | Status: SHIPPED | OUTPATIENT
Start: 2019-09-06 | End: 2020-07-30

## 2019-09-20 DIAGNOSIS — Z12.5 SCREENING FOR PROSTATE CANCER: Primary | ICD-10-CM

## 2019-09-28 ENCOUNTER — HEALTH MAINTENANCE LETTER (OUTPATIENT)
Age: 64
End: 2019-09-28

## 2019-10-22 DIAGNOSIS — Z12.5 SCREENING FOR PROSTATE CANCER: ICD-10-CM

## 2019-10-22 LAB — PSA SERPL-MCNC: 4.85 UG/L (ref 0–4)

## 2019-10-24 DIAGNOSIS — R97.20 RISING PSA LEVEL: Primary | ICD-10-CM

## 2019-10-31 ENCOUNTER — ANCILLARY PROCEDURE (OUTPATIENT)
Dept: MRI IMAGING | Facility: CLINIC | Age: 64
End: 2019-10-31
Attending: FAMILY MEDICINE
Payer: COMMERCIAL

## 2019-10-31 DIAGNOSIS — R97.20 RISING PSA LEVEL: ICD-10-CM

## 2019-10-31 RX ORDER — GADOBUTROL 604.72 MG/ML
7.5 INJECTION INTRAVENOUS ONCE
Status: COMPLETED | OUTPATIENT
Start: 2019-10-31 | End: 2019-10-31

## 2019-10-31 RX ADMIN — GADOBUTROL 7.5 ML: 604.72 INJECTION INTRAVENOUS at 13:31

## 2019-10-31 NOTE — DISCHARGE INSTRUCTIONS
MRI Contrast Discharge Instructions    The IV contrast you received today will pass out of your body in your  urine. This will happen in the next 24 hours. You will not feel this process.  Your urine will not change color.    Drink at least 4 extra glasses of water or juice today (unless your doctor  has restricted your fluids). This reduces the stress on your kidneys.  You may take your regular medicines.    If you are on dialysis: It is best to have dialysis today.    If you have a reaction: Most reactions happen right away. If you have  any new symptoms after leaving the hospital (such as hives or swelling),  call your hospital at the correct number below. Or call your family doctor.  If you have breathing distress or wheezing, call 911.    Special instructions: ***    I have read and understand the above information.    Signature:______________________________________ Date:___________    Staff:__________________________________________ Date:___________     Time:__________    [facility] Departments:    ___[place]: [phone]  ___[facility]: [phone]  ___[place]: [phone] ___[facility]: [phone]  ___[facility]: [phone]  ___[facility]: [phone]  ___[phone]  ___[facility]: [phone]  ___[phone]

## 2019-12-17 ENCOUNTER — OFFICE VISIT (OUTPATIENT)
Dept: FAMILY MEDICINE | Facility: CLINIC | Age: 64
End: 2019-12-17
Payer: COMMERCIAL

## 2019-12-17 VITALS
BODY MASS INDEX: 24.35 KG/M2 | DIASTOLIC BLOOD PRESSURE: 70 MMHG | WEIGHT: 162.5 LBS | TEMPERATURE: 97.7 F | HEART RATE: 83 BPM | RESPIRATION RATE: 16 BRPM | OXYGEN SATURATION: 98 % | SYSTOLIC BLOOD PRESSURE: 127 MMHG

## 2019-12-17 DIAGNOSIS — D18.01 CHERRY HEMANGIOMA: ICD-10-CM

## 2019-12-17 DIAGNOSIS — L82.1 SEBORRHEIC KERATOSIS: Primary | ICD-10-CM

## 2019-12-17 NOTE — PROGRESS NOTES
SUBJECTIVE:   Donald Torres is a 64 year old male who presents to clinic today for a return visit.    Here with wife    # Lesion on Back  - was itchy yesterday  - has been present for a while  - no longer itchy, not painful  - if not worried about cancer he does not care to have it removed  - no personal history of skin cancer  - sister had a skin cancer on leg, unsure of type    ROS: Denies fevers, chills, chest pain, difficulty breathing, abdominal pain    Patient Active Problem List   Diagnosis     Hyperlipidemia LDL goal <100     Esophageal reflux     Post-nasal drainage     Ganglion cyst of left foot     Fear of public speaking     Adjustment insomnia     Current Outpatient Medications   Medication     atorvastatin (LIPITOR) 20 MG tablet     Fexofenadine HCl (MUCINEX ALLERGY PO)     propranolol (INDERAL) 10 MG tablet     traZODone (DESYREL) 50 MG tablet     ASPIRIN PO     Calcium Carbonate (CALCIUM 600 PO)     doxylamine (UNISOM) 25 MG TABS tablet     Glucosamine HCl (GLUCOSAMINE PO)     MELATONIN PO     Ranitidine HCl (ZANTAC PO)     terbinafine (LAMISIL) 1 % external cream     No current facility-administered medications for this visit.        I have reviewed the patient's relevant past medical history.     OBJECTIVE:   /70 (BP Location: Right arm, Patient Position: Sitting, Cuff Size: Adult Large)   Pulse 83   Temp 97.7  F (36.5  C) (Oral)   Resp 16   Wt 73.7 kg (162 lb 8 oz)   SpO2 98%   BMI 24.35 kg/m      Constitutional: well-appearing, appears stated age  Eyes: conjunctivae without erythema, sclera anicteric.   Psych: affect is full and appropriate, speech is fluent and non-pressured    Skin: approx 2x7mm raised brown lesion left mid back. Rough texture, stuck-on appearance. Multiple similar lesions scattered across back.  Several red raised papules across torso consistent with cherry hemangiomas  Few flat brown macules on back            ASSESSMENT AND PLAN:     (L82.1) Seborrheic  keratosis  (primary encounter diagnosis)  Comment: Lesion of interest consistent with seborrheic keratosis. Has several of these. Reassured Josep that this is a benign lesion. Does not desire removal. Given his significant sun exposure and multiple pigmented skin lesions, I think it would be reasonable for Josep to follow every 1-2 years with dermatology for skin checks. He previously had a dermatologist who retired. Will send him to the [facility] [place] [place].   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

## 2020-07-27 DIAGNOSIS — E78.5 HYPERLIPIDEMIA LDL GOAL <100: ICD-10-CM

## 2020-07-28 NOTE — TELEPHONE ENCOUNTER
Last time prescribed: 07/16/2019 , 90 tabs x 4 refills  Last office visit: 12/17/2019  Next appointment: No future appointments  Last lipid panel: 7/15/19    Medication is being filled for 1 time refill only due to:  Due for labs - pt notified     Radha Fan RN  07/30/20  10:05 AM

## 2020-07-30 RX ORDER — ATORVASTATIN CALCIUM 20 MG/1
20 TABLET, FILM COATED ORAL DAILY
Qty: 90 TABLET | Refills: 0 | Status: SHIPPED | OUTPATIENT
Start: 2020-07-30 | End: 2020-10-12

## 2020-09-17 ENCOUNTER — OFFICE VISIT (OUTPATIENT)
Dept: FAMILY MEDICINE | Facility: CLINIC | Age: 65
End: 2020-09-17
Payer: COMMERCIAL

## 2020-09-17 VITALS
HEART RATE: 55 BPM | DIASTOLIC BLOOD PRESSURE: 85 MMHG | WEIGHT: 156 LBS | TEMPERATURE: 94.1 F | BODY MASS INDEX: 23.64 KG/M2 | OXYGEN SATURATION: 98 % | RESPIRATION RATE: 16 BRPM | HEIGHT: 68 IN | SYSTOLIC BLOOD PRESSURE: 163 MMHG

## 2020-09-17 DIAGNOSIS — Z12.11 SCREEN FOR COLON CANCER: ICD-10-CM

## 2020-09-17 DIAGNOSIS — Z23 NEEDS FLU SHOT: ICD-10-CM

## 2020-09-17 DIAGNOSIS — E78.5 HYPERLIPIDEMIA LDL GOAL <100: ICD-10-CM

## 2020-09-17 DIAGNOSIS — Z00.00 MEDICARE ANNUAL WELLNESS VISIT, SUBSEQUENT: Primary | ICD-10-CM

## 2020-09-17 DIAGNOSIS — E55.9 VITAMIN D DEFICIENCY: ICD-10-CM

## 2020-09-17 DIAGNOSIS — Z23 NEED FOR PNEUMOCOCCAL VACCINATION: ICD-10-CM

## 2020-09-17 DIAGNOSIS — R23.3 EASY BRUISABILITY: ICD-10-CM

## 2020-09-17 DIAGNOSIS — Z12.5 SCREENING FOR PROSTATE CANCER: ICD-10-CM

## 2020-09-17 LAB
APTT PPP: 29 SEC (ref 22–37)
BUN SERPL-MCNC: 13 MG/DL (ref 7–30)
CALCIUM SERPL-MCNC: 9.8 MG/DL (ref 8.5–10.4)
CHLORIDE SERPLBLD-SCNC: 103 MMOL/L (ref 94–109)
CHOLEST SERPL-MCNC: 171 MG/DL (ref 0–200)
CHOLEST/HDLC SERPL: 2.6 {RATIO} (ref 0–5)
CO2 SERPL-SCNC: 28 MMOL/L (ref 20–32)
CREAT SERPL-MCNC: 1 MG/DL (ref 0.8–1.5)
EGFR CALCULATED (BLACK REFERENCE): 96.1
EGFR CALCULATED (NON BLACK REFERENCE): 79.5
ERYTHROCYTE [DISTWIDTH] IN BLOOD BY AUTOMATED COUNT: 12.3 % (ref 10–15)
FASTING SPECIMEN: YES
GLUCOSE SERPL-MCNC: 101 MG/DL (ref 60–99)
HCT VFR BLD AUTO: 44.5 % (ref 40–53)
HDLC SERPL-MCNC: 66 MG/DL
HGB BLD-MCNC: 14.8 G/DL (ref 13.3–17.7)
INR PPP: 1.03 (ref 0.86–1.14)
LDLC SERPL CALC-MCNC: 81 MG/DL (ref 0–129)
MCH RBC QN AUTO: 30 PG (ref 26.5–33)
MCHC RBC AUTO-ENTMCNC: 33.3 G/DL (ref 31.5–36.5)
MCV RBC AUTO: 90 FL (ref 78–100)
PLATELET # BLD AUTO: 238 10E9/L (ref 150–450)
POTASSIUM SERPL-SCNC: 4 MMOL/L (ref 3.4–5.3)
PSA SERPL-ACNC: 4.97 UG/L (ref 0–4)
RBC # BLD AUTO: 4.93 10E12/L (ref 4.4–5.9)
SODIUM SERPL-SCNC: 143 MMOL/L (ref 137.3–146.3)
TRIGL SERPL-MCNC: 124 MG/DL (ref 0–150)
VLDL-CHOLESTEROL: 25 (ref 7–32)
WBC # BLD AUTO: 5.4 10E9/L (ref 4–11)

## 2020-09-17 RX ORDER — FAMOTIDINE 20 MG/1
20 TABLET, FILM COATED ORAL 2 TIMES DAILY
COMMUNITY

## 2020-09-17 SDOH — HEALTH STABILITY: MENTAL HEALTH: HOW OFTEN DO YOU HAVE A DRINK CONTAINING ALCOHOL?: 4 OR MORE TIMES A WEEK

## 2020-09-17 SDOH — HEALTH STABILITY: MENTAL HEALTH: HOW OFTEN DO YOU HAVE 6 OR MORE DRINKS ON ONE OCCASION?: NEVER

## 2020-09-17 SDOH — HEALTH STABILITY: MENTAL HEALTH: HOW MANY STANDARD DRINKS CONTAINING ALCOHOL DO YOU HAVE ON A TYPICAL DAY?: 1 OR 2

## 2020-09-17 ASSESSMENT — MIFFLIN-ST. JEOR: SCORE: 1463.86

## 2020-09-17 NOTE — NURSING NOTE
"Injectable Influenza Immunization Documentation    1.  Has the patient received the information for the injectable influenza vaccine? YES     2. Is the patient 6 months of age or older? YES     3. Does the patient have any of the following contraindications?         Severe allergy to eggs? No     Severe allergic reaction to previous influenza vaccines? No   Severe allergy to latex? No       History of Guillain-Murdock syndrome? No     Currently have a temperature greater than 100.4F? No        4.  Severely egg allergic patients should have flu vaccine eligibility assessed by an MD, RN, or pharmacist, and those who received flu vaccine should be observed for 15 min by an MD, RN, Pharmacist, Medical Technician, or member of clinic staff.\": YES    5. Latex-allergic patients should be given latex-free influenza vaccine Yes. Please reference the Vaccine latex table to determine if your clinic s product is latex-containing.       Vaccination given by Susy Colon CMA,[facility]  September 17, 2020 1:40 PM              Prior to immunization administration, verified patients identity using patient s name and date of birth. Please see Immunization Activity for additional information.     Screening Questionnaire for Adult Immunization    Are you sick today?   No   Do you have allergies to medications, food, a vaccine component or latex?   No   Have you ever had a serious reaction after receiving a vaccination?   No   Do you have a long-term health problem with heart, lung, kidney, or metabolic disease (e.g., diabetes), asthma, a blood disorder, no spleen, complement component deficiency, a cochlear implant, or a spinal fluid leak?  Are you on long-term aspirin therapy?   No   Do you have cancer, leukemia, HIV/AIDS, or any other immune system problem?   No   Do you have a parent, brother, or sister with an immune system problem?   No   In the past 3 months, have you taken medications that affect  your immune system, such as prednisone, " other steroids, or anticancer drugs; drugs for the treatment of rheumatoid arthritis, Crohn s disease, or psoriasis; or have you had radiation treatments?   No   Have you had a seizure, or a brain or other nervous system problem?   No   During the past year, have you received a transfusion of blood or blood    products, or been given immune (gamma) globulin or antiviral drug?   No   For women: Are you pregnant or is there a chance you could become       pregnant during the next month?   No   Have you received any vaccinations in the past 4 weeks?   No     Immunization questionnaire answers were all negative.        Per orders of Dr. Wilkinson, injection of [id number] given by Susy Colon CMA. Patient instructed to remain in clinic for 15 minutes afterwards, and to report any adverse reaction to me immediately.       Screening performed by Susy Colon CMA on 9/17/2020 at 1:39 PM.

## 2020-09-17 NOTE — NURSING NOTE
"65 year old  Chief Complaint   Patient presents with     Physical     with labs and flu shot     Hand Problem     breaking blood vessels in hands frequently, x past year       Blood pressure (!) 149/74, pulse 59, temperature 94.1  F (34.5  C), temperature source Skin, resp. rate 16, height 1.73 m (5' 8.11\"), weight 70.8 kg (156 lb), SpO2 98 %. Body mass index is 23.64 kg/m .  Patient Active Problem List   Diagnosis     Hyperlipidemia LDL goal <100     Esophageal reflux     Post-nasal drainage     Ganglion cyst of left foot     Fear of public speaking     Adjustment insomnia       Wt Readings from Last 2 Encounters:   09/17/20 70.8 kg (156 lb)   12/17/19 73.7 kg (162 lb 8 oz)     BP Readings from Last 3 Encounters:   09/17/20 (!) 149/74   12/17/19 127/70   07/15/19 119/75         Current Outpatient Medications   Medication     atorvastatin (LIPITOR) 20 MG tablet     doxylamine (UNISOM) 25 MG TABS tablet     famotidine (PEPCID) 20 MG tablet     Fexofenadine HCl (MUCINEX ALLERGY PO)     propranolol (INDERAL) 10 MG tablet     traZODone (DESYREL) 50 MG tablet     ASPIRIN PO     Calcium Carbonate (CALCIUM 600 PO)     Glucosamine HCl (GLUCOSAMINE PO)     MELATONIN PO     Ranitidine HCl (ZANTAC PO)     No current facility-administered medications for this visit.        Social History     Tobacco Use     Smoking status: Never Smoker     Smokeless tobacco: Never Used   Substance Use Topics     Alcohol use: Yes     Frequency: 4 or more times a week     Drinks per session: 1 or 2     Binge frequency: Never     Comment: wine couple times per week     Drug use: No       Health Maintenance Due   Topic Date Due     ADVANCE CARE PLANNING  1955     ZOSTER IMMUNIZATION (1 of 2) 04/29/2004     COLORECTAL CANCER SCREENING  08/30/2017     AORTIC ANEURYSM SCREENING (SYSTEM ASSIGNED)  04/29/2019     PHQ-2  01/01/2020     MEDICARE ANNUAL WELLNESS VISIT  07/15/2020     FALL RISK ASSESSMENT  07/15/2020     INFLUENZA VACCINE (1) " 09/01/2020     PNEUMOCOCCAL IMMUNIZATION 65+ LOW/MEDIUM RISK (2 of 2 - PPSV23) 07/15/2020       No results found for: PAP      September 17, 2020 9:23 AM'

## 2020-09-18 PROBLEM — Z00.00 MEDICARE ANNUAL WELLNESS VISIT, SUBSEQUENT: Status: ACTIVE | Noted: 2020-09-18

## 2020-09-18 LAB — DEPRECATED CALCIDIOL+CALCIFEROL SERPL-MC: 54 UG/L (ref 20–75)

## 2020-09-18 NOTE — PROGRESS NOTES
CHIEF COMPLAINT:  Medicare annual wellness visit, subsequent.      HISTORY OF PRESENT ILLNESS:  Josep is a 65 year old here for the above.  Overall, he has done very well.  Neither he nor his wife has come down with obvious COVID-19 infection.      The only thing that he is really concerned about is that over the past year or so, he notices that his hands sometimes develop small areas that look as though a blood vessel has burst.  Josep bench presses 120 pounds in 20 pound reps.  He wears gloves while he does so.  This is the only new thing that he has been doing with his hands.  However, occasionally when he is opening something like a drawer, he might find that on the flexor aspect of one of his fingers, there is a small, raised area that looks like a bruise.  These eventually always resolve.  He has had no problems with nosebleeds, difficulty with clotting, dark urine or obvious blood in his stool.  We talked about any clotting issues that he might have, and we can certainly investigate that.  However, the most likely cause for this is loss of fat in his fingers.  This certainly happens with aging.  He is certainly content with that if that is indeed what the problem is.      He had a few other questions.  Most of these are related to healthcare maintenance issues, which I will review below.  He has had some throat clearing and thicker phlegm.  He occasionally uses guaifenesin, which really helps.  However, his throat clearing bothers his wife.  If he drinks more fluids, he voids more and does not want to do that.      He and his wife will be going to [place] in October, and they will need a COVID test done 72 hours before that.  We can arrange that.  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  He eventually did fine.  This year, we will certainly send him  with some Diamox that he can take when he arrives.  He had some vitamin questions as well.      ACTIVE MEDICAL PROBLEMS:  Reviewed.        CURRENT MEDICATIONS:  Reviewed.      SOCIAL, FAMILY AND PAST SURGICAL HISTORIES:  Unchanged.      MEDICARE QUESTIONS:  No problems at all with any activities of daily living.  He has had no falls at home in the last year.  No symptoms of depression in the last month.  No problems at all with memory loss.      HEALTHCARE MAINTENANCE:  He wears glasses, and eye care is up to date.  Occasionally, he has to deal with some floaters.  His hearing is fine.  Dental care is up to date.  Blood pressure initially 149/74, and this went up to 163/85.  In 2019, he had readings ranging from 119-127/70-75.  Therefore, of course, we will pay close attention to this.  His weight is 156, down almost 7 pounds from a year ago.  From an immunization standpoint, he needs a second pneumococcal vaccine.  We will also give a seasonal flu shot today.  He should go to his pharmacy to get a shingles vaccine.  We will check some labs today, including CBC (primarily to look at his platelets), INR, PTT, PSA 50, vitamin D, lipid panel and a basic metabolic panel.  He needs a referral for a colonoscopy.  This has been placed.        OBJECTIVE:     GENERAL:  Josep is in absolutely no distress.     VITAL SIGNS:  Blood pressure readings as mentioned.  Pulse is 59 and regular.  Temperature is 94.1.  He is 5 feet 8 and weighs 156 pounds with a BMI of 23.64.  O2 sats are 98% on room air.   HEENT:  Head is normocephalic and atraumatic.  Ears are free of any cerumen.  The TMs look fine.  There is no pain with palpation of the frontal or maxillary sinuses.  Eyes are grossly normal.   NECK:  No anterior or posterior chain adenopathy in his neck.  No visible or palpable thyromegaly.  No carotid bruits.   LUNGS:  Clear to auscultation bilaterally.   HEART:  Regular rate and rhythm without murmur.   EXTREMITIES:  Good  peripheral pulses in his feet.  No ankle edema.      LABORATORY:  Pending.      ASSESSMENT/PLAN:   1.  Medicare annual wellness visit, up to date with healthcare maintenance strategies.   2.  Seasonal influenza immunization given today.   3.  PCV 23 given today.   4.  Labs, including lipid panel, PSA 50, vitamin D, CBC, PTT and INR, all pending.   5.  Colonoscopy.  Order has been placed.   6.  Not described above, but his hands are fine.  There is no sign of easy bruising.  He has no bruising on his forearms.  No sign of any bleeding issues.  Reassurance given.     7.  Contact us before his trip to [place] for COVID testing.     8.  Contact us before his trip to [place] to receive a small prescription for Diamox (acetazolamide) to try and prevent HAPE.  Call with any problems or questions in the meantime.

## 2020-10-12 DIAGNOSIS — E78.5 HYPERLIPIDEMIA LDL GOAL <100: ICD-10-CM

## 2020-10-12 DIAGNOSIS — Z01.812 ENCOUNTER FOR SCREENING LABORATORY TESTING FOR COVID-19 VIRUS IN ASYMPTOMATIC PATIENT: Primary | ICD-10-CM

## 2020-10-12 DIAGNOSIS — Z11.52 ENCOUNTER FOR SCREENING LABORATORY TESTING FOR COVID-19 VIRUS IN ASYMPTOMATIC PATIENT: Primary | ICD-10-CM

## 2020-10-12 RX ORDER — ATORVASTATIN CALCIUM 20 MG/1
20 TABLET, FILM COATED ORAL DAILY
Qty: 90 TABLET | Refills: 3 | Status: SHIPPED | OUTPATIENT
Start: 2020-10-12 | End: 2021-11-29

## 2020-10-12 NOTE — TELEPHONE ENCOUNTER
Last visit 9/17/20, no future visit  Prescription approved per [facility] [place].  Nelsy Rain RN  [facility]

## 2020-10-19 ENCOUNTER — TELEPHONE (OUTPATIENT)
Dept: GASTROENTEROLOGY | Facility: CLINIC | Age: 65
End: 2020-10-19

## 2020-10-19 NOTE — TELEPHONE ENCOUNTER
Patient is scheduled for colonoscopy with Dr. IRAIS Morales    Spoke with: patient    Date of Procedure: 12-18-20    Location: asc    Sedation Type cs    Pre-op for Unit J MAC not needed    Informed patient they will need an adult  yes    Informed Patient of COVID Test Requirement yes    Preferred Pharmacy for Pre Prescription [facility]    Confirmed Nurse will call to complete assessment yes    Additional comments: none

## 2020-10-25 DIAGNOSIS — Z11.59 ENCOUNTER FOR SCREENING FOR OTHER VIRAL DISEASES: Primary | ICD-10-CM

## 2020-11-23 DIAGNOSIS — T70.20XA ALTITUDE SICKNESS: Primary | ICD-10-CM

## 2020-11-23 RX ORDER — ACETAZOLAMIDE 125 MG/1
TABLET ORAL
Qty: 10 TABLET | Refills: 1 | Status: SHIPPED | OUTPATIENT
Start: 2020-11-23 | End: 2021-11-24

## 2020-12-15 DIAGNOSIS — Z11.59 ENCOUNTER FOR SCREENING FOR OTHER VIRAL DISEASES: ICD-10-CM

## 2020-12-15 LAB
SARS-COV-2 RNA SPEC QL NAA+PROBE: NORMAL
SPECIMEN SOURCE: NORMAL

## 2020-12-15 PROCEDURE — 99000 SPECIMEN HANDLING OFFICE-LAB: CPT | Performed by: PATHOLOGY

## 2020-12-15 PROCEDURE — U0003 INFECTIOUS AGENT DETECTION BY NUCLEIC ACID (DNA OR RNA); SEVERE ACUTE RESPIRATORY SYNDROME CORONAVIRUS 2 (SARS-COV-2) (CORONAVIRUS DISEASE [COVID-19]), AMPLIFIED PROBE TECHNIQUE, MAKING USE OF HIGH THROUGHPUT TECHNOLOGIES AS DESCRIBED BY CMS-2020-01-R: HCPCS | Mod: 90 | Performed by: PATHOLOGY

## 2020-12-16 LAB
LABORATORY COMMENT REPORT: NORMAL
SARS-COV-2 RNA SPEC QL NAA+PROBE: NEGATIVE
SPECIMEN SOURCE: NORMAL

## 2020-12-18 ENCOUNTER — HOSPITAL ENCOUNTER (OUTPATIENT)
Facility: AMBULATORY SURGERY CENTER | Age: 65
Discharge: HOME | End: 2020-12-18
Attending: INTERNAL MEDICINE | Admitting: INTERNAL MEDICINE
Payer: COMMERCIAL

## 2020-12-18 VITALS
HEIGHT: 69 IN | HEART RATE: 60 BPM | DIASTOLIC BLOOD PRESSURE: 60 MMHG | WEIGHT: 165 LBS | BODY MASS INDEX: 24.44 KG/M2 | TEMPERATURE: 97.8 F | SYSTOLIC BLOOD PRESSURE: 125 MMHG | OXYGEN SATURATION: 98 % | RESPIRATION RATE: 18 BRPM

## 2020-12-18 LAB — COLONOSCOPY: NORMAL

## 2020-12-18 PROCEDURE — 88305 TISSUE EXAM BY PATHOLOGIST: CPT | Performed by: PATHOLOGY

## 2020-12-18 PROCEDURE — 45385 COLONOSCOPY W/LESION REMOVAL: CPT | Mod: PT

## 2020-12-18 PROCEDURE — 45380 COLONOSCOPY AND BIOPSY: CPT | Mod: PT,59

## 2020-12-18 RX ORDER — LIDOCAINE 40 MG/G
CREAM TOPICAL
Status: DISCONTINUED | OUTPATIENT
Start: 2020-12-18 | End: 2020-12-19 | Stop reason: HOSPADM

## 2020-12-18 RX ORDER — FENTANYL CITRATE 50 UG/ML
INJECTION, SOLUTION INTRAMUSCULAR; INTRAVENOUS PRN
Status: DISCONTINUED | OUTPATIENT
Start: 2020-12-18 | End: 2020-12-18 | Stop reason: HOSPADM

## 2020-12-18 RX ORDER — ONDANSETRON 2 MG/ML
4 INJECTION INTRAMUSCULAR; INTRAVENOUS
Status: DISCONTINUED | OUTPATIENT
Start: 2020-12-18 | End: 2020-12-19 | Stop reason: HOSPADM

## 2020-12-18 ASSESSMENT — MIFFLIN-ST. JEOR: SCORE: 1510.88

## 2020-12-18 NOTE — DISCHARGE INSTRUCTIONS
Discharge Instructions after Colonoscopy  or Sigmoidoscopy    Today you had a Colonoscopy    Activity and Diet  You were given medicine for pain. You may be dizzy or sleepy.  For 24 hours:    Do not drive or use heavy equipment.    Do not make important decisions.    Do not drink any alcohol.  You may return to your normal diet and medicines.    Discomfort    Air was placed in your colon during the exam in order to see it. Walking helps to pass the air.    You may take Tylenol (acetaminophen) for pain unless your doctor has told you not to.  Do not take aspirin or ibuprofen (Advil, Motrin, or other anti-inflammatory  drugs) for _____ days.    Follow-up  ____ We took small tissue samples or polyps to study. Your doctor will call you with the results  within two weeks.    When to call:    Call right away if you have:    Unusual pain in belly or chest pain not relieved with passing air.    More than 1 to 2 Tablespoons of bleeding from your rectum.    Fever above 100.6  F (37.5  C).    If you have severe pain, bleeding, or shortness of breath, go to an emergency room.    If you have questions, call:  Monday to Friday, 7 a.m. to 4:30 p.m.  Endoscopy: [phone] (We may have to call you back)    After hours  Hospital: [phone] (Ask for the GI fellow on call)

## 2020-12-18 NOTE — LETTER
December 22, 2020      Donald Torres  [address] [address]      Dear ,    We are writing to inform you of your test results.    During your colonoscopy we removed four polyps.  Two of these polyps were found to be adenomas by our pathologists.  Adenomas are benign (not cancerous) growths that carry a small risk of progressing to cancer if not completely removed.   The polyps found during your colonoscopy were removed completely.  The other two polyps were hyperplastic polyps and as such pose no risk to your health.  They were also completely removed.     Given these results I would recommend you have a repeat, surveillance colonoscopy in 7 years.      These results have been copied to your usual doctor or a doctor who directly referred you to see me.      Chuy Morales MD  Associate Professor of Medicine  [facility]   Division of Gastroenterology, Hepatology, and Nutrition      Resulted Orders   COLONOSCOPY   Result Value Ref Range    COLONOSCOPY       [facility]  [address] [place] [address]., [place] [address] [phone]     Endoscopy Department  _______________________________________________________________________________  Patient Name: Donald Torres           Procedure Date: 12/18/2020 6:58 AM  MRN: [id number]                       Account Number: [id number]  YOB: 1955              Admit Type: Outpatient  Age: 65                               Room: Pro 2  Gender: Male                          Note Status: Finalized  Attending MD: Chuy Morales MD          Total Sedation Time:   _______________________________________________________________________________     Procedure:           Colonoscopy  Indications:         Screening for colorectal malignant neoplasm  Providers:           Chuy Morales MD, Margot Latham  Referring MD:          Medicines:           Midazolam 4 mg IV, Fentanyl 150 micrograms IV  Complications:       No immediate  complications.  ______________________________________________________________ _________________  Procedure:           Pre-Anesthesia Assessment:                       - Prior to the procedure, a History and Physical was                        performed, and patient medications and allergies were                        reviewed. The patient's tolerance of previous anesthesia                        was also reviewed. The risks and benefits of the                        procedure and the sedation options and risks were                        discussed with the patient. All questions were answered,                        and informed consent was obtained. Prior Anticoagulants:                        The patient has taken no previous anticoagulant or                        antiplatelet agents. ASA Grade Assessment: II - A                        patient with mild systemic disease. After reviewing the                        risks and benefits, the patient was deemed in                        satisfactory condition to undergo the procedure.                       After o btaining informed consent, the colonoscope was                        passed under direct vision. Throughout the procedure,                        the patient's blood pressure, pulse, and oxygen                        saturations were monitored continuously. The Endoscope                        was introduced through the anus and advanced to the                        cecum, identified by appendiceal orifice and ileocecal                        valve. The colonoscopy was performed without difficulty.                        The patient tolerated the procedure well. The quality of                        the bowel preparation was excellent.                                                                                   Findings:       Two sessile polyps were found in the mid transverse colon. The polyps        were 3 to 6 mm in size. These polyps were  removed with a cold snare.        Resection and retrieval were complete.       Two sessile polyps were found in the mid descending colon.  The polyps        were 4 to 5 mm in size. These polyps were removed with a jumbo cold        forceps. Resection and retrieval were complete.       The exam was otherwise without abnormality.                                                                                   Impression:          - Two 3 to 6 mm polyps in the mid transverse colon,                        removed with a cold snare. Resected and retrieved.                       - Two 4 to 5 mm polyps in the mid descending colon,                        removed with a jumbo cold forceps. Resected and                        retrieved.                       - The examination was otherwise normal.  Recommendation:      - Repeat colonoscopy in 3 years for surveillance based                        on pathology results.                       - Return to referring physician as previously scheduled.                                                                                     Electronically Signed by:  Dr. Chuy Morales  __________ ____  Chuy Morales MD  12/18/2020 9:31:16 AM  I was physically present for the entire viewing portion of the exam.  __________________________  Signature of teaching physician  Chuy Morales MD  Number of Addenda: 0    Note Initiated On: 12/18/2020 6:58 AM  Scope In:  Scope Out:

## 2020-12-21 LAB — COPATH REPORT: NORMAL

## 2021-01-19 DIAGNOSIS — G47.00 INSOMNIA, UNSPECIFIED TYPE: ICD-10-CM

## 2021-01-19 RX ORDER — TRAZODONE HYDROCHLORIDE 50 MG/1
50 TABLET, FILM COATED ORAL AT BEDTIME
Qty: 90 TABLET | Refills: 2 | Status: SHIPPED | OUTPATIENT
Start: 2021-01-19 | End: 2022-02-01

## 2021-01-19 NOTE — TELEPHONE ENCOUNTER
Last visit 9/17/20, no future visit.  Prescription approved per [facility] Refill Protocol.  Nelsy Rain RN  [facility]

## 2021-05-10 DIAGNOSIS — E78.5 HYPERLIPIDEMIA LDL GOAL <100: Primary | ICD-10-CM

## 2021-05-10 DIAGNOSIS — G47.00 INSOMNIA, UNSPECIFIED TYPE: ICD-10-CM

## 2021-05-10 RX ORDER — TRAZODONE HYDROCHLORIDE 50 MG/1
50 TABLET, FILM COATED ORAL AT BEDTIME
Qty: 20 TABLET | Refills: 0 | Status: SHIPPED | OUTPATIENT
Start: 2021-05-10 | End: 2022-02-01

## 2021-05-10 RX ORDER — ATORVASTATIN CALCIUM 20 MG/1
20 TABLET, FILM COATED ORAL DAILY
Qty: 20 TABLET | Refills: 0 | Status: SHIPPED | OUTPATIENT
Start: 2021-05-10 | End: 2021-11-29

## 2021-06-15 ENCOUNTER — OFFICE VISIT (OUTPATIENT)
Dept: FAMILY MEDICINE | Facility: CLINIC | Age: 66
End: 2021-06-15
Payer: COMMERCIAL

## 2021-06-15 VITALS
RESPIRATION RATE: 15 BRPM | SYSTOLIC BLOOD PRESSURE: 122 MMHG | TEMPERATURE: 97.2 F | HEART RATE: 55 BPM | WEIGHT: 166.5 LBS | HEIGHT: 68 IN | DIASTOLIC BLOOD PRESSURE: 70 MMHG | OXYGEN SATURATION: 98 % | BODY MASS INDEX: 25.23 KG/M2

## 2021-06-15 DIAGNOSIS — L57.0 ACTINIC KERATOSIS: Primary | ICD-10-CM

## 2021-06-15 DIAGNOSIS — M25.562 RECURRENT PAIN OF LEFT KNEE: ICD-10-CM

## 2021-06-15 RX ORDER — CETIRIZINE HYDROCHLORIDE 10 MG/1
10 TABLET ORAL DAILY
COMMUNITY
End: 2022-12-12

## 2021-06-15 ASSESSMENT — MIFFLIN-ST. JEOR: SCORE: 1507.12

## 2021-06-15 NOTE — NURSING NOTE
"66 year old  Chief Complaint   Patient presents with     Derm Problem     mole looks different x 6-9 months, itches occasionally        Blood pressure 122/70, pulse 55, temperature 97.2  F (36.2  C), temperature source Skin, resp. rate 15, height 1.731 m (5' 8.15\"), weight 75.5 kg (166 lb 8 oz), SpO2 98 %. Body mass index is 25.2 kg/m .  Patient Active Problem List   Diagnosis     Hyperlipidemia LDL goal <100     Esophageal reflux     Post-nasal drainage     Ganglion cyst of left foot     Fear of public speaking     Adjustment insomnia     Medicare annual wellness visit, subsequent       Wt Readings from Last 2 Encounters:   06/15/21 75.5 kg (166 lb 8 oz)   12/18/20 74.8 kg (165 lb)     BP Readings from Last 3 Encounters:   06/15/21 122/70   12/18/20 125/60   09/17/20 (!) 163/85         Current Outpatient Medications   Medication     acetaZOLAMIDE (DIAMOX) 125 MG tablet     atorvastatin (LIPITOR) 20 MG tablet     atorvastatin (LIPITOR) 20 MG tablet     Calcium Carbonate (CALCIUM 600 PO)     cetirizine (ZYRTEC) 10 MG tablet     famotidine (PEPCID) 20 MG tablet     propranolol (INDERAL) 10 MG tablet     traZODone (DESYREL) 50 MG tablet     traZODone (DESYREL) 50 MG tablet     doxylamine (UNISOM) 25 MG TABS tablet     Fexofenadine HCl (MUCINEX ALLERGY PO)     MELATONIN PO     No current facility-administered medications for this visit.        Social History     Tobacco Use     Smoking status: Never Smoker     Smokeless tobacco: Never Used   Substance Use Topics     Alcohol use: Yes     Frequency: 4 or more times a week     Drinks per session: 1 or 2     Binge frequency: Never     Comment: wine couple times per week     Drug use: No       Health Maintenance Due   Topic Date Due     ADVANCE CARE PLANNING  Never done     ZOSTER IMMUNIZATION (1 of 2) Never done     AORTIC ANEURYSM SCREENING (SYSTEM ASSIGNED)  Never done       No results found for: PAP      Екатерина 15, 2021 2:55 PM    "

## 2021-06-18 NOTE — PROGRESS NOTES
"CHIEF COMPLAINT:  Skin lesion and the left knee pain.    HISTORY OF PRESENT ILLNESS:  Josep is a 66-year-old who has a lesion on his back that his wife has noticed and that he can feel.  It seems like it has been changing a bit over the last 6-9 months and itches a bit.  He has no personal history of skin cancer.  However, he would like to have this looked at and treated if necessary.    In addition, his left knee has been giving him some recurrent problems.  He initially experienced significant pain in it while on a hike in [place] a few years ago.  It occurred again last year while he was in [place] and walking on some shifting sand.  Finally, he was recently running with his granddaughter, playing soccer, and then he got some pain again.  He can typically put his finger right on the spot that hurts and it seems to be that it is just inside the knee itself.  He can fully extend the leg and even walk on it.  Nothing that has been disabling.  He has not had any significant imaging done to date.    ACTIVE MEDICAL PROBLEMS:  Reviewed.    CURRENT MEDICATIONS:  Reviewed.    OBJECTIVE:  Josep is in absolutely no distress.  Breathing comfortably.  Affect upbeat.  /70 with a pulse of 55 and regular.  Temperature is 97.2.  He is 5 feet 8.15 inches in height and weighs 166 pounds, 8 ounces with a BMI of 25.2.  O2 sats are 98% on room air.  Examination of the back reveals a lesion just to the right of the spine in the mid back area that is approximately 1 cm in length and 4 mm in width.  It is occurring in a diagonal going in a \"northeast\" direction.  Slightly 3-dimensional.  It has a rough quality to it.  It could be an actinic keratosis or inflamed seborrheic keratosis.  In any case, I will freeze it.  If it falls off, nothing further needs to be done.  If it does not, I would like him to see Dermatology for further assessment.  Examination of the left knee reveals no swelling.  No erythema.  No pain with palpation of the " patellar tendon.  No pain with palpation along the tibial plateau.  No crepitus with passive range of motion.  I had Josep lie down.  Negative anterior drawer sign and Lachman's is negative.  No laxity with varus or valgus stress.  Dayana's test was essentially negative as well.    ASSESSMENT AND PLAN:     1.  Probable actinic keratosis, back.  Frozen with 3 freeze-thaw cycles of liquid nitrogen.  We will see how he does with that.  Follow the plan as outlined above.  2.  Recurrent left knee pain.  It is very possible that he is having very small tears to his medial meniscus and possibly his lateral meniscus.  However, his exam is benign today.  I think the plan at this point would be to have him contact me within days of a new onset of pain.  At that point, we would likely get an MRI of the knee to determine what is going on and decide what would be useful next step.  Josep completely agrees with the above plan.

## 2021-10-23 ENCOUNTER — HEALTH MAINTENANCE LETTER (OUTPATIENT)
Age: 66
End: 2021-10-23

## 2021-11-16 ENCOUNTER — OFFICE VISIT (OUTPATIENT)
Dept: FAMILY MEDICINE | Facility: CLINIC | Age: 66
End: 2021-11-16
Payer: COMMERCIAL

## 2021-11-16 VITALS
BODY MASS INDEX: 25.46 KG/M2 | HEART RATE: 65 BPM | RESPIRATION RATE: 15 BRPM | DIASTOLIC BLOOD PRESSURE: 87 MMHG | SYSTOLIC BLOOD PRESSURE: 144 MMHG | OXYGEN SATURATION: 98 % | HEIGHT: 68 IN | WEIGHT: 168 LBS | TEMPERATURE: 96.6 F

## 2021-11-16 DIAGNOSIS — Z23 NEED FOR INFLUENZA VACCINATION: ICD-10-CM

## 2021-11-16 DIAGNOSIS — R35.0 URINARY FREQUENCY: ICD-10-CM

## 2021-11-16 DIAGNOSIS — E78.5 HYPERLIPIDEMIA LDL GOAL <100: ICD-10-CM

## 2021-11-16 DIAGNOSIS — Z00.00 MEDICARE ANNUAL WELLNESS VISIT, SUBSEQUENT: Primary | ICD-10-CM

## 2021-11-16 LAB
ALBUMIN UR-MCNC: NEGATIVE MG/DL
ANION GAP SERPL CALCULATED.3IONS-SCNC: 5 MMOL/L (ref 3–14)
APPEARANCE UR: CLEAR
BILIRUB UR QL STRIP: NEGATIVE
BUN SERPL-MCNC: 23 MG/DL (ref 7–30)
CALCIUM SERPL-MCNC: 9.4 MG/DL (ref 8.5–10.1)
CHLORIDE BLD-SCNC: 104 MMOL/L (ref 94–109)
CHOLEST SERPL-MCNC: 179 MG/DL
CO2 SERPL-SCNC: 31 MMOL/L (ref 20–32)
COLOR UR AUTO: NORMAL
CREAT SERPL-MCNC: 0.88 MG/DL (ref 0.66–1.25)
FASTING STATUS PATIENT QL REPORTED: ABNORMAL
GFR SERPL CREATININE-BSD FRML MDRD: 89 ML/MIN/1.73M2
GLUCOSE BLD-MCNC: 94 MG/DL (ref 70–99)
GLUCOSE UR STRIP-MCNC: NEGATIVE MG/DL
HDLC SERPL-MCNC: 55 MG/DL
HGB UR QL STRIP: NEGATIVE
KETONES UR STRIP-MCNC: NEGATIVE MG/DL
LDLC SERPL CALC-MCNC: 78 MG/DL
LEUKOCYTE ESTERASE UR QL STRIP: NEGATIVE
NITRATE UR QL: NEGATIVE
NONHDLC SERPL-MCNC: 124 MG/DL
PH UR STRIP: 7 [PH] (ref 5–7)
POTASSIUM BLD-SCNC: 3.7 MMOL/L (ref 3.4–5.3)
PSA SERPL-MCNC: 6.18 UG/L (ref 0–4)
RBC URINE: 1 /HPF
SODIUM SERPL-SCNC: 140 MMOL/L (ref 133–144)
SP GR UR STRIP: 1.01 (ref 1–1.03)
TRIGL SERPL-MCNC: 232 MG/DL
UROBILINOGEN UR STRIP-MCNC: NORMAL MG/DL
WBC URINE: <1 /HPF

## 2021-11-16 PROCEDURE — 81001 URINALYSIS AUTO W/SCOPE: CPT | Performed by: FAMILY MEDICINE

## 2021-11-16 PROCEDURE — 83718 ASSAY OF LIPOPROTEIN: CPT | Performed by: FAMILY MEDICINE

## 2021-11-16 PROCEDURE — 80048 BASIC METABOLIC PNL TOTAL CA: CPT | Performed by: FAMILY MEDICINE

## 2021-11-16 PROCEDURE — G0103 PSA SCREENING: HCPCS | Performed by: FAMILY MEDICINE

## 2021-11-16 RX ORDER — TOLTERODINE TARTRATE 1 MG/1
TABLET, EXTENDED RELEASE ORAL
Qty: 30 TABLET | Refills: 1 | Status: SHIPPED | OUTPATIENT
Start: 2021-11-16 | End: 2022-06-03

## 2021-11-16 ASSESSMENT — MIFFLIN-ST. JEOR: SCORE: 1515.51

## 2021-11-16 NOTE — NURSING NOTE
"66 year old  Chief Complaint   Patient presents with     Physical     no other concerns        Blood pressure (!) 154/78, pulse 65, temperature (!) 96.6  F (35.9  C), temperature source Skin, resp. rate 15, height 1.734 m (5' 8.25\"), weight 76.2 kg (168 lb), SpO2 98 %. Body mass index is 25.36 kg/m .  Patient Active Problem List   Diagnosis     Hyperlipidemia LDL goal <100     Esophageal reflux     Post-nasal drainage     Ganglion cyst of left foot     Fear of public speaking     Adjustment insomnia     Medicare annual wellness visit, subsequent       Wt Readings from Last 2 Encounters:   11/16/21 76.2 kg (168 lb)   06/15/21 75.5 kg (166 lb 8 oz)     BP Readings from Last 3 Encounters:   11/16/21 (!) 154/78   06/15/21 122/70   12/18/20 125/60         Current Outpatient Medications   Medication     atorvastatin (LIPITOR) 20 MG tablet     atorvastatin (LIPITOR) 20 MG tablet     Calcium Carbonate (CALCIUM 600 PO)     cetirizine (ZYRTEC) 10 MG tablet     famotidine (PEPCID) 20 MG tablet     Fexofenadine HCl (MUCINEX ALLERGY PO)     propranolol (INDERAL) 10 MG tablet     traZODone (DESYREL) 50 MG tablet     traZODone (DESYREL) 50 MG tablet     acetaZOLAMIDE (DIAMOX) 125 MG tablet     doxylamine (UNISOM) 25 MG TABS tablet     MELATONIN PO     No current facility-administered medications for this visit.       Social History     Tobacco Use     Smoking status: Never Smoker     Smokeless tobacco: Never Used   Substance Use Topics     Alcohol use: Yes     Comment: glass of wine every other night     Drug use: No       Health Maintenance Due   Topic Date Due     ADVANCE CARE PLANNING  Never done     ZOSTER IMMUNIZATION (1 of 2) Never done     AORTIC ANEURYSM SCREENING (SYSTEM ASSIGNED)  Never done     INFLUENZA VACCINE (1) 09/01/2021     MEDICARE ANNUAL WELLNESS VISIT  09/17/2021     FALL RISK ASSESSMENT  09/17/2021       No results found for: PAP      November 16, 2021 12:57 PM    "

## 2021-11-16 NOTE — PROGRESS NOTES
CHIEF COMPLAINT: Medicare Annual Wellness Exam, Subsequent      HISTORY OF PRESENT ILLNESS:  Donald Torres is a 66 year old male who presents for an annual wellness visit.  Overall, he is doing well. He wears glasses and eye care is up to date. He reports some potential decline in his hearing, but not to the point where he wants a formal evaluation. His dental care is up to date. His BP is 154/78 today, which is higher than usual for him. Body mass index is 25.36 kg/m . From an immunization standpoint, he is due for an influenza vaccine and a Shingrix vaccine series. He elects to receive his influenza vaccine today. Josep has received three doses of the Pfizer COVID-19 vaccine, third dose administered on 9/24/21. He had a colonoscopy on 12/18/20 that found two tubular adenomas, recommended repeat colonoscopy in 3 years for surveillance. He is due for prostate cancer screening, PSA completed today. Josep had a prostate MRI on 10/31/19 due to a rising PSA level, results classified as PIRADS 2 - Clinically significant cancer is unlikely to be present.      Josep reports an onset of urinary symptoms about 5 years ago while on a trip to [place]. He will need to urinate more frequently and has difficulty postponing urination, which he thinks may be due to nervousness while driving, traveling, or doing things. He will have to urinate about an hour into a drive and again another 1-2 hours later. He reports a slightly decreased urine stream strength but he will void a large quantity of urine when going to the bathroom. Josep has cut tea out of his diet when he will be going places and this has seemed to help, but not resolve the issue. He does not have any of these symptoms while at home. Josep would be interested in started a medication for use PRN.    Josep has hyperlipidemia, for which he is taking atorvastatin 20 mg every night before bed. He is doing well on this medication and has not had any side effects. Josep is due for  "updated labs today. He also takes Pepcid daily for GERD symptoms and heartburn, which is helpful.     Josep takes propranolol 10 mg PRN about an hour prior to public speaking engagements or work events. He typically takes this about 12 times per year, but does not seem to need it for subsequent events if they are closer together in time. This medication has helped with his nervousness, anxiety, and dry mouth.     Josep has insomnia and has a prescription for trazodone 50 mg tablets. He does not use it every night, but will take half of a tablet if he is having trouble sleeping. He uses this about 3-4 times per week. He has found that it helps him relax and calm his thoughts before going to sleep.     MEDICARE PREVENTATIVE VISIT:   1. No problems with ADLs  2. No falls  3. No depression  4. Not concerned about memory loss     FAMILY, SOCIAL AND PAST SURGICAL HISTORIES: Updated      MEDICATIONS:   Carefully Reviewed      REVIEW OF SYSTEMS:  10-point review of systems negative unless otherwise stated in the HPI.       OBJECTIVE:    EXAM:    GENERAL: Josep is in no distress.  Affect is upbeat.   Alert and oriented x3  BP (!) 154/78 (BP Location: Right arm, Patient Position: Sitting, Cuff Size: Adult Regular)   Pulse 65   Temp (!) 96.6  F (35.9  C) (Skin)   Resp 15   Ht 1.734 m (5' 8.25\")   Wt 76.2 kg (168 lb)   SpO2 98%   BMI 25.36 kg/m    HEENT:  Head is normocephalic, atraumatic. Ears clear bilaterally. No pain with palpation of the frontal or maxillary sinuses.  Eyes are grossly normal.  Nose and mouth masked.  Neck is supple without adenopathy or thyromegaly.  No carotid bruits are heard, no JVD.   BACK:  Smooth and straight.  No pain to percussion.  No CVA tenderness.   LUNGS:  Clear to auscultation bilaterally.   HEART:  Regular rate and rhythm without murmur.   EXTREMITIES:  Ankles free of any edema.   SKIN:  Warm and dry.       LABORATORY DATA:   No results found for any visits on 11/16/21.        ASSESSMENT " AND PLAN:   1. Annual Wellness Exam: Up to date with most healthcare maintenance strategies. Influenza vaccine administered today. I recommended that he start the Shingrix vaccine series when he returns from an upcoming trip to [place]. BMP, lipid panel completed today, results pending. Josep is not fasting.   2. Screening for prostate cancer: PSA completed today, results pending.  3. Hyperlipidemia LDL goal <100: Josep is doing well on his current medications, no reported side effects. Results of his lipid panel are pending.   4. Urinary frequency: To help with his urinary symptoms, Josep will take one tablet of tolterodine 1 mg approximately 1 hour prior to prolonged driving or traveling. I have sent in the prescription for this today, 30 tablets with one additional refills. Routine UA with culture completed today, results pending.   5. Follow up in 1 year or call if there are any questions or concerns.      I, Mary Arauz, am serving as a scribe to document services personally performed by Dr. Calderon Wilkinson, based on data collection and the provider's statements to me. Dr. Wilkinson has reviewed, edited, and approved the above note.     I, Dr. Calderon Wilkinson, have interviewed and examined this patient, and I have thoroughly reviewed and edited this note and agree with its contents.

## 2021-11-16 NOTE — NURSING NOTE
"Injectable Influenza Immunization Documentation    1.  Has the patient received the information for the injectable influenza vaccine? YES     2. Is the patient 6 months of age or older? YES     3. Does the patient have any of the following contraindications?         Severe allergy to eggs? No     Severe allergic reaction to previous influenza vaccines? No   Severe allergy to latex? No       History of Guillain-Lansing syndrome? No     Currently have a temperature greater than 100.4F? No        4.  Severely egg allergic patients should have flu vaccine eligibility assessed by an MD, RN, or pharmacist, and those who received flu vaccine should be observed for 15 min by an MD, RN, Pharmacist, Medical Technician, or member of clinic staff.\": YES    5. Latex-allergic patients should be given latex-free influenza vaccine Yes. Please reference the Vaccine latex table to determine if your clinic s product is latex-containing.       Vaccination given by Susy Colon CMA,[facility]  November 16, 2021 2:39 PM                "

## 2021-11-21 ENCOUNTER — MYC MEDICAL ADVICE (OUTPATIENT)
Dept: FAMILY MEDICINE | Facility: CLINIC | Age: 66
End: 2021-11-21
Payer: COMMERCIAL

## 2021-11-21 DIAGNOSIS — R97.20 RISING PSA LEVEL: Primary | ICD-10-CM

## 2021-11-27 ENCOUNTER — MYC MEDICAL ADVICE (OUTPATIENT)
Dept: FAMILY MEDICINE | Facility: CLINIC | Age: 66
End: 2021-11-27
Payer: COMMERCIAL

## 2021-11-27 DIAGNOSIS — E78.5 HYPERLIPIDEMIA LDL GOAL <100: ICD-10-CM

## 2021-11-29 RX ORDER — ATORVASTATIN CALCIUM 20 MG/1
20 TABLET, FILM COATED ORAL DAILY
Qty: 90 TABLET | Refills: 3 | Status: SHIPPED | OUTPATIENT
Start: 2021-11-29 | End: 2022-11-15

## 2021-11-29 NOTE — TELEPHONE ENCOUNTER
Medication requested: atorvastatin (LIPITOR) 20 MG tablet  Last office visit: 11/16/21  [facility] appointments: none  Medication last refilled: 10/12/20 #90 + 3 refills  Last qualifying labs: 11/16/21  Prescription approved per Refill Protocol.  Radha Fan MS RN-BC  11/29/21  1:20 PM

## 2021-12-14 ENCOUNTER — ANCILLARY PROCEDURE (OUTPATIENT)
Dept: MRI IMAGING | Facility: CLINIC | Age: 66
End: 2021-12-14
Attending: FAMILY MEDICINE
Payer: COMMERCIAL

## 2021-12-14 DIAGNOSIS — R97.20 RISING PSA LEVEL: ICD-10-CM

## 2021-12-14 PROCEDURE — A9585 GADOBUTROL INJECTION: HCPCS | Performed by: RADIOLOGY

## 2021-12-14 PROCEDURE — 72197 MRI PELVIS W/O & W/DYE: CPT | Mod: 26 | Performed by: RADIOLOGY

## 2021-12-14 RX ORDER — GADOBUTROL 604.72 MG/ML
7.5 INJECTION INTRAVENOUS ONCE
Status: COMPLETED | OUTPATIENT
Start: 2021-12-14 | End: 2021-12-14

## 2021-12-14 RX ADMIN — GADOBUTROL 7.5 ML: 604.72 INJECTION INTRAVENOUS at 14:26

## 2021-12-14 NOTE — DISCHARGE INSTRUCTIONS
MRI Contrast Discharge Instructions    The IV contrast you received today will pass out of your body in your  urine. This will happen in the next 24 hours. You will not feel this process.  Your urine will not change color.    Drink at least 4 extra glasses of water or juice today (unless your doctor  has restricted your fluids). This reduces the stress on your kidneys.  You may take your regular medicines.    If you are on dialysis: It is best to have dialysis today.    If you have a reaction: Most reactions happen right away. If you have  any new symptoms after leaving the hospital (such as hives or swelling),  call your hospital at the correct number below. Or call your family doctor.  If you have breathing distress or wheezing, call 911.    Special instructions: ***    I have read and understand the above information.    Signature:______________________________________ Date:___________    Staff:__________________________________________ Date:___________     Time:__________    [facility] Departments:    ___[place]: [phone]  ___[facility]: [phone]  ___[place]: [phone] ___[facility]: [phone]  ___[facility]: [phone]  ___[facility]: [phone]  ___[phone]  ___[facility]: [phone]  ___[phone]

## 2021-12-17 DIAGNOSIS — R97.20 ELEVATED PROSTATE SPECIFIC ANTIGEN (PSA): Primary | ICD-10-CM

## 2022-02-01 DIAGNOSIS — G47.00 INSOMNIA, UNSPECIFIED TYPE: ICD-10-CM

## 2022-02-01 RX ORDER — TRAZODONE HYDROCHLORIDE 50 MG/1
50 TABLET, FILM COATED ORAL AT BEDTIME
Qty: 90 TABLET | Refills: 2 | Status: SHIPPED | OUTPATIENT
Start: 2022-02-01 | End: 2023-01-11

## 2022-02-01 NOTE — TELEPHONE ENCOUNTER
Trazodone (Desyrel) 50 mg    Last Office Visit: 11/16/21  Future [facility] Appointments: None  Medication last refilled: 1/19/21 #90 with 2 refill(s)    Prescription approved per [facility] Refill Protocol.    aJnae Brown, RN, BSN

## 2022-04-26 ENCOUNTER — LAB (OUTPATIENT)
Dept: LAB | Facility: CLINIC | Age: 67
End: 2022-04-26
Payer: COMMERCIAL

## 2022-04-26 DIAGNOSIS — R97.20 ELEVATED PROSTATE SPECIFIC ANTIGEN (PSA): ICD-10-CM

## 2022-04-26 LAB — PSA SERPL-MCNC: 8.56 UG/L (ref 0–4)

## 2022-04-26 PROCEDURE — G0103 PSA SCREENING: HCPCS

## 2022-04-29 DIAGNOSIS — R97.20 ELEVATED PROSTATE SPECIFIC ANTIGEN (PSA): ICD-10-CM

## 2022-04-29 DIAGNOSIS — R97.20 RISING PSA LEVEL: Primary | ICD-10-CM

## 2022-05-26 ENCOUNTER — HOSPITAL ENCOUNTER (OUTPATIENT)
Dept: MRI IMAGING | Facility: CLINIC | Age: 67
Discharge: HOME | End: 2022-05-26
Attending: FAMILY MEDICINE | Admitting: FAMILY MEDICINE
Payer: COMMERCIAL

## 2022-05-26 DIAGNOSIS — R97.20 ELEVATED PROSTATE SPECIFIC ANTIGEN (PSA): ICD-10-CM

## 2022-05-26 DIAGNOSIS — R97.20 RISING PSA LEVEL: ICD-10-CM

## 2022-05-26 PROCEDURE — A9585 GADOBUTROL INJECTION: HCPCS | Performed by: FAMILY MEDICINE

## 2022-05-26 PROCEDURE — 72197 MRI PELVIS W/O & W/DYE: CPT | Mod: 26 | Performed by: RADIOLOGY

## 2022-05-26 PROCEDURE — 255N000002 HC RX 255 OP 636: Performed by: FAMILY MEDICINE

## 2022-05-26 PROCEDURE — 72197 MRI PELVIS W/O & W/DYE: CPT

## 2022-05-26 RX ORDER — GADOBUTROL 604.72 MG/ML
7.5 INJECTION INTRAVENOUS ONCE
Status: COMPLETED | OUTPATIENT
Start: 2022-05-26 | End: 2022-05-26

## 2022-05-26 RX ADMIN — GADOBUTROL 7.5 ML: 604.72 INJECTION INTRAVENOUS at 08:42

## 2022-06-02 NOTE — PROGRESS NOTES
"Josep is a 67 year old who is being evaluated via a billable video visit.    Can you please confirm what state you are currently located in? [place]     How would you like to obtain your AVS? MyChart  If the video visit is dropped, the invitation should be resent by: Text to cell phone: mobile   Will anyone else be joining your video visit? No      Video Start Time:   Start time: 11:32 AM  End time: 11:48 AM  Total : 16 minutes    Assessment & Plan    1. Rising PSA level: Referred to Urology, recommend Dr. Augustine Delvalle      BMI:   Estimated body mass index is 25.36 kg/m  as calculated from the following:    Height as of 11/16/21: 1.734 m (5' 8.25\").    Weight as of 11/16/21: 76.2 kg (168 lb).     Calderon Wilkinson MD, MD  [facility]    I, Holli Maldonado, am serving as a scribe to document services personally performed by Dr. Calderon Wilkinson, based on data collection and the provider's statements to me. Dr. Wilkinson has reviewed, edited, and approved the above note.     I, Dr. Calderon Wilkinson, have interviewed and examined this patient, and I have thoroughly reviewed and edited this note and agree with its contents.        Subjective   Josep is a 67 year old who presents for the following health issues  accompanied by his spouse, Leslie.    HPI     Due to rising PSA levels over the past 2 years, Josep got a prostate MRI on 5/26/22. The most suspicious abnormality was characterized as PIRADS 2 - Clinically significant cancer is unlikely to be present. No suspicious adenopathy or evidence of pelvic metastases. We discussed these results together today.     Josep has a history of urinary symptoms and reports that his bladder problems are variable. Sometime his stream is normal and other times it is difficult to urinate. Compared to 10 years ago, he has noticed a definite reduction in his stream and difficulty starting urination. He is open to following with Urology.     We reviewed and updated his medication list today. "     Review of Systems   CONSTITUTIONAL: NEGATIVE for fever, chills, change in weight  INTEGUMENTARY/SKIN: NEGATIVE for worrisome rashes, moles or lesions  EYES: NEGATIVE for vision changes or irritation  ENT/MOUTH: NEGATIVE for ear, mouth and throat problems  RESP: NEGATIVE for significant cough or SOB  BREAST: NEGATIVE for masses, tenderness or discharge  CV: NEGATIVE for chest pain, palpitations or peripheral edema  GI: NEGATIVE for nausea, abdominal pain, heartburn, or change in bowel habits  : NEGATIVE for frequency, dysuria, or hematuria  MUSCULOSKELETAL: NEGATIVE for significant arthralgias or myalgia  NEURO: NEGATIVE for weakness, dizziness or paresthesias  ENDOCRINE: NEGATIVE for temperature intolerance, skin/hair changes  HEME: NEGATIVE for bleeding problems  PSYCHIATRIC: NEGATIVE for changes in mood or affect      Objective         Vitals:  No vitals were obtained today due to virtual visit.    Physical Exam   GENERAL: Healthy, alert and no distress  EYES: Eyes grossly normal to inspection.  No discharge or erythema, or obvious scleral/conjunctival abnormalities.  RESP: No audible wheeze, cough, or visible cyanosis.  No visible retractions or increased work of breathing.    SKIN: Visible skin clear. No significant rash, abnormal pigmentation or lesions.  NEURO: Cranial nerves grossly intact.  Mentation and speech appropriate for age.  PSYCH: Mentation appears normal, affect normal/bright, judgement and insight intact, normal speech and appearance well-groomed.      Video-Visit Details    Type of service:  Video Visit    Video End Time: 11:48 am    Originating Location (pt. Location): Home    Distant Location (provider location):  [facility]     Platform used for Video Visit: PandaBed

## 2022-06-03 ENCOUNTER — VIRTUAL VISIT (OUTPATIENT)
Dept: FAMILY MEDICINE | Facility: CLINIC | Age: 67
End: 2022-06-03
Payer: COMMERCIAL

## 2022-06-03 DIAGNOSIS — R97.20 RISING PSA LEVEL: Primary | ICD-10-CM

## 2022-06-27 ENCOUNTER — PRE VISIT (OUTPATIENT)
Dept: UROLOGY | Facility: CLINIC | Age: 67
End: 2022-06-27

## 2022-06-27 NOTE — TELEPHONE ENCOUNTER
MEDICAL RECORDS REQUEST   [facility]  Urology Clinic  [address] [place] [address]  PHONE: [phone]  Fax: [phone]        FUTURE VISIT INFORMATION                                                   Donald Torres, : 1955 scheduled for future visit at [facility]    APPOINTMENT INFORMATION:    Date: 2022    Provider:  Augustine Delvalle MD    Reason for Visit/Diagnosis: elevated PSA    REFERRAL INFORMATION:    Referring provider:  Calderon Wilkinson MD    Referring providers clinic:  [facility]    RECORDS REQUESTED FOR VISIT                                                     NOTES  STATUS/DETAILS   OFFICE NOTE from referring provider  yes, 2022 -- Calderon Wilkinson MD in [facility]   MEDICATION LIST  yes   LABS     URINALYSIS (UA)  yes   MRI OF PROSTATE  yes, 2022, 2021, 10/31/2019   PSA (LAB)  yes     PRE-VISIT CHECKLIST      Record collection complete Yes   Appointment appropriately scheduled           (right time/right provider) Yes   Joint diagnostic appointment coordinated correctly          (ensure right order & amount of time) Yes   MyChart activation Yes   Questionnaire complete If no, please explain pending

## 2022-07-21 ENCOUNTER — PRE VISIT (OUTPATIENT)
Dept: UROLOGY | Facility: CLINIC | Age: 67
End: 2022-07-21

## 2022-07-21 NOTE — TELEPHONE ENCOUNTER
Reason for visit: Consult (referred by Calderon Wilkinson MD from [facility])     Relevant information: Elevated PSA    Records/imaging/labs/orders: All records available; PSA on 8/1    Pt called: Send [employer] message about PSA    At Rooming: Standard     no fever and no chills. no fever and no chills. **ATTENDING ADDENDUM (Dr. Can Harry): POSITIVE history of morbid obesity.

## 2022-07-28 ENCOUNTER — TELEPHONE (OUTPATIENT)
Dept: UROLOGY | Facility: CLINIC | Age: 67
End: 2022-07-28

## 2022-07-28 ENCOUNTER — MYC MEDICAL ADVICE (OUTPATIENT)
Dept: UROLOGY | Facility: CLINIC | Age: 67
End: 2022-07-28

## 2022-07-28 DIAGNOSIS — R97.20 ELEVATED PROSTATE SPECIFIC ANTIGEN (PSA): Primary | ICD-10-CM

## 2022-07-28 NOTE — TELEPHONE ENCOUNTER
Left voicemail asking the patient to set up a lab appointment for PSA. Left clinic number for the patient to call.

## 2022-08-01 ENCOUNTER — LAB (OUTPATIENT)
Dept: LAB | Facility: CLINIC | Age: 67
End: 2022-08-01
Payer: COMMERCIAL

## 2022-08-01 DIAGNOSIS — R97.20 ELEVATED PROSTATE SPECIFIC ANTIGEN (PSA): ICD-10-CM

## 2022-08-01 LAB — PSA SERPL-MCNC: 7.14 UG/L (ref 0–4)

## 2022-08-01 PROCEDURE — 84153 ASSAY OF PSA TOTAL: CPT | Performed by: PATHOLOGY

## 2022-08-01 PROCEDURE — 36415 COLL VENOUS BLD VENIPUNCTURE: CPT | Performed by: PATHOLOGY

## 2022-08-02 ENCOUNTER — OFFICE VISIT (OUTPATIENT)
Dept: UROLOGY | Facility: CLINIC | Age: 67
End: 2022-08-02
Attending: FAMILY MEDICINE
Payer: COMMERCIAL

## 2022-08-02 VITALS
DIASTOLIC BLOOD PRESSURE: 76 MMHG | SYSTOLIC BLOOD PRESSURE: 145 MMHG | HEIGHT: 68 IN | BODY MASS INDEX: 25.46 KG/M2 | WEIGHT: 168 LBS | HEART RATE: 52 BPM

## 2022-08-02 DIAGNOSIS — R97.20 ELEVATED PROSTATE SPECIFIC ANTIGEN (PSA): Primary | ICD-10-CM

## 2022-08-02 DIAGNOSIS — R97.20 RISING PSA LEVEL: ICD-10-CM

## 2022-08-02 PROCEDURE — 99204 OFFICE O/P NEW MOD 45 MIN: CPT | Performed by: UROLOGY

## 2022-08-02 ASSESSMENT — PAIN SCALES - GENERAL: PAINLEVEL: NO PAIN (0)

## 2022-08-02 NOTE — PROGRESS NOTES
"HPI:  Donald Torres is a 67 year old male being seen for elevated PSA.      Endorses hesitancy, weak stream, mild nocturia. No irritatitive symptoms. No hematuria.    No family history of prostate cancer. Father had history of upper tract cancer.     {ACCOMPANIED BY STATEMENT (Optional):576238}  Reviewed previous notes from  *** from *** service at ***    Exam:  BP (!) 145/76   Pulse 52   Ht 1.727 m (5' 8\")   Wt 76.2 kg (168 lb)   BMI 25.54 kg/m    {urology exam options:624541}    Review of Imaging:  The following imaging exams were independently viewed and interpreted by me and discussed with patient:  {Imaging Urology:394419}    Review of Labs:  The following labs were reviewed by me and discussed with the patient:  Recent Results (from the past 720 hour(s))   PSA tumor marker    Collection Time: 08/01/22 12:37 PM   Result Value Ref Range    PSA Tumor Marker 7.14 (H) 0.00 - 4.00 ug/L         Assessment & Plan   1. ***  2. ***    Augustine Delvalle MD  [facility] [place]      ==========================      Additional Coding Information:    Problems:  {[facility] problems:377006}    Data Reviewed  {Tests, documents, or independent historian(s) (Optional):602740}    Tests ordered: ***    {Independent interpretation of tests (Optional):976169::\"Independent interpretation of a test performed by another physician/other qualified health care professional (not separately reported) - ***\"}    {Discussion of management or test interpretation (Optional):053284::\"Discussion of management or test interpretation with external physician/other qualified healthcare professional/appropriate source - ***\"}    Level of risk:  {[facility] risk of intervention:657507}    Time spent:  {2021 E&M time (Optional):013965}            "
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
## 2022-08-02 NOTE — NURSING NOTE
"Chief Complaint   Patient presents with     Consult     Elevated PSA       Blood pressure (!) 145/76, pulse 52, height 1.727 m (5' 8\"), weight 76.2 kg (168 lb). Body mass index is 25.54 kg/m .    Patient Active Problem List   Diagnosis     Hyperlipidemia LDL goal <100     Esophageal reflux     Post-nasal drainage     Ganglion cyst of left foot     Fear of public speaking     Adjustment insomnia     Medicare annual wellness visit, subsequent       No Known Allergies    Current Outpatient Medications   Medication Sig Dispense Refill     atorvastatin (LIPITOR) 20 MG tablet Take 1 tablet (20 mg) by mouth daily 90 tablet 3     Calcium Carbonate (CALCIUM 600 PO) Take 600 Units by mouth 2 times daily       cetirizine (ZYRTEC) 10 MG tablet Take 10 mg by mouth daily       famotidine (PEPCID) 20 MG tablet Take 20 mg by mouth 2 times daily       traZODone (DESYREL) 50 MG tablet Take 1 tablet (50 mg) by mouth At Bedtime 90 tablet 2       Social History     Tobacco Use     Smoking status: Never Smoker     Smokeless tobacco: Never Used     Tobacco comment: few cigarettes in high school (less than 10)   Substance Use Topics     Alcohol use: Yes     Comment: glass of wine every other night     Drug use: No       Leora Vega, EMT  8/2/2022  7:55 AM  "
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
## 2022-09-14 ENCOUNTER — TELEPHONE (OUTPATIENT)
Dept: UROLOGY | Facility: CLINIC | Age: 67
End: 2022-09-14

## 2022-09-14 DIAGNOSIS — R97.20 ELEVATED PROSTATE SPECIFIC ANTIGEN (PSA): Primary | ICD-10-CM

## 2022-09-14 RX ORDER — CIPROFLOXACIN 500 MG/1
500 TABLET, FILM COATED ORAL 2 TIMES DAILY
Qty: 6 TABLET | Refills: 0 | Status: SHIPPED | OUTPATIENT
Start: 2022-09-14 | End: 2022-09-17

## 2022-09-14 NOTE — TELEPHONE ENCOUNTER
Left message going over prep for biopsy including:      No blood thinning medications for 7 days before procedure, including aspirin, anticoagulants, ibuprofen and fish oil.       prophylactic antibiotics sent to primary pharmacy listed in chart:   -take the day before, the day of and the day after the procedure, one tablet, two times daily.      Complete a Fleets enema approximately 2 hours before the scheduled procedure.      Do not come to the appointment fasted.

## 2022-09-20 ENCOUNTER — OFFICE VISIT (OUTPATIENT)
Dept: UROLOGY | Facility: CLINIC | Age: 67
End: 2022-09-20
Payer: COMMERCIAL

## 2022-09-20 VITALS
SYSTOLIC BLOOD PRESSURE: 141 MMHG | DIASTOLIC BLOOD PRESSURE: 98 MMHG | BODY MASS INDEX: 25.76 KG/M2 | HEIGHT: 68 IN | HEART RATE: 76 BPM | WEIGHT: 170 LBS

## 2022-09-20 DIAGNOSIS — Z53.9 ERRONEOUS ENCOUNTER--DISREGARD: Primary | ICD-10-CM

## 2022-09-20 ASSESSMENT — PAIN SCALES - GENERAL: PAINLEVEL: NO PAIN (0)

## 2022-09-20 NOTE — LETTER
9/20/2022       RE: Donald Torres  [address]  [facility] [address]     Dear Colleague,    Thank you for referring your patient, Donald Torres, to the [facility] [place]. Please see a copy of my visit note below.      This encounter was opened in error. Please disregard.      Again, thank you for allowing me to participate in the care of your patient.      Sincerely,    Augustine Delvalle MD

## 2022-09-20 NOTE — LETTER
Date:September 26, 2022      Provider requested that no letter be sent. Do not send.       [facility]

## 2022-09-21 ENCOUNTER — TELEPHONE (OUTPATIENT)
Dept: UROLOGY | Facility: CLINIC | Age: 67
End: 2022-09-21

## 2022-09-21 NOTE — TELEPHONE ENCOUNTER
[facility] Call Center    Phone Message    May a detailed message be left on voicemail: yes     Reason for Call: Other: Josep is calling trying to figure out his new prostate biopsy time for [facility] on 9/22 as he states he already started taking the antibiotic. Please call him asap with the time, thanks!     Action Taken: Other: ua uro    Travel Screening: Not Applicable

## 2022-09-21 NOTE — TELEPHONE ENCOUNTER
Returned phone call and went over instructions for biopsy procedure and when to arrive.     Naila Colvin LPN

## 2022-09-22 ENCOUNTER — OFFICE VISIT (OUTPATIENT)
Dept: UROLOGY | Facility: CLINIC | Age: 67
End: 2022-09-22
Payer: COMMERCIAL

## 2022-09-22 VITALS
SYSTOLIC BLOOD PRESSURE: 132 MMHG | DIASTOLIC BLOOD PRESSURE: 70 MMHG | BODY MASS INDEX: 25.76 KG/M2 | WEIGHT: 170 LBS | HEART RATE: 62 BPM | HEIGHT: 68 IN | OXYGEN SATURATION: 98 %

## 2022-09-22 DIAGNOSIS — R97.20 RISING PSA LEVEL: ICD-10-CM

## 2022-09-22 DIAGNOSIS — Z79.2 PROPHYLACTIC ANTIBIOTIC: ICD-10-CM

## 2022-09-22 DIAGNOSIS — R97.20 ELEVATED PROSTATE SPECIFIC ANTIGEN (PSA): Primary | ICD-10-CM

## 2022-09-22 PROCEDURE — G0416 PROSTATE BIOPSY, ANY MTHD: HCPCS | Performed by: PATHOLOGY

## 2022-09-22 PROCEDURE — 76942 ECHO GUIDE FOR BIOPSY: CPT | Performed by: UROLOGY

## 2022-09-22 PROCEDURE — 55700 PR BIOPSY OF PROSTATE,NEEDLE/PUNCH: CPT | Performed by: UROLOGY

## 2022-09-22 PROCEDURE — 96372 THER/PROPH/DIAG INJ SC/IM: CPT | Mod: 59 | Performed by: UROLOGY

## 2022-09-22 RX ORDER — LIDOCAINE HYDROCHLORIDE 10 MG/ML
20 INJECTION, SOLUTION EPIDURAL; INFILTRATION; INTRACAUDAL; PERINEURAL ONCE
Status: COMPLETED | OUTPATIENT
Start: 2022-09-22 | End: 2022-09-22

## 2022-09-22 RX ORDER — GENTAMICIN 40 MG/ML
80 INJECTION, SOLUTION INTRAMUSCULAR; INTRAVENOUS ONCE
Status: COMPLETED | OUTPATIENT
Start: 2022-09-22 | End: 2022-09-22

## 2022-09-22 RX ADMIN — GENTAMICIN 80 MG: 40 INJECTION, SOLUTION INTRAMUSCULAR; INTRAVENOUS at 11:10

## 2022-09-22 RX ADMIN — LIDOCAINE HYDROCHLORIDE 10 ML: 10 INJECTION, SOLUTION EPIDURAL; INFILTRATION; INTRACAUDAL; PERINEURAL at 12:00

## 2022-09-22 ASSESSMENT — PAIN SCALES - GENERAL: PAINLEVEL: NO PAIN (0)

## 2022-09-22 NOTE — NURSING NOTE
Chief Complaint   Patient presents with     Elevated PSA     Patient here today for in office Transrectal Ultrasound Guided Prostate Biopsy with Dr Delvalle         Procedure was explained to patient prior to performing said procedure. The patient signed the consent form and all questions were answered prior to the procedure. Any pre-procedural antibiotics were given according to the performing physicians recommendation. Pt's information was confirmed on samples and samples were sent for analysis. Paient reviewed information on labels sent with patient and confirmed the accuracy of all the labels.    Consent read and signed: Yes   No Known Allergies  Performing Physician: Dr. Bolanos  Antibiotic taken?  Yes  Aspirin or other blood thinning medications discontinued 7-10 days:  Yes  Time of Fleet's enema:  Yes  Patient given Gentamicin intramuscular injection 30 minutes prior to procedure Yes    12 samples were taken from the right and left, of the prostate respectively. Yes additional samples were taken. Vitals were repeated prior to patient leaving and instructions for post TRUS care were explained to the pt.     The following medication was given:     MEDICATION:  Lidocaine 1% Soln  ROUTE: local infiltration (anus)  SITE: prostate  DOSE: 10 ml  LOT #: [id number]  : [employer]  EXPIRATION DATE: 09/01/2023  NDC#: 0115-3723-25   Was there drug waste? No  Multi-dose vial: No    The following medication was given:     MEDICATION:  Lidocaine 1% Soln  ROUTE: RECTAL  SITE: PROSTATE  DOSE: 10ML  LOT #: [id number]  : [employer]  EXPIRATION DATE: 09/01/2023  NDC#: 4907-0830-49  Was there drug waste? No  Multi-dose vial: No      [facility] DickinsonCommunity Health  September 22, 2022

## 2022-09-22 NOTE — PROGRESS NOTES
PREPROCEDURE DIAGNOSIS: Elevated PSA    POSTPROCEDURE DIAGNOSIS: Elevated PSA.     PROCEDURE: Transrectal ultrasound sizing and transrectal ultrasound guided prostatic needle biopsy for Josep Torres who is a 67 year old male. PSA 7.14.    SURGEON: Augustine Delvalle    ANESTHESIA: 5 mL of 1% periprostatic block bilaterally     DESCRIPTION OF PROCEDURE: The procedure, the outcome, the anesthesia, and the risks were discussed with the patient. Informed consent was obtained and signed and a timeout was completed prior to the procedure. Digital rectal examination was performed with the below findings noted. Anesthesia was administered as noted above and the transrectal ultrasound probe was inserted, sizing was performed, and the below findings were noted. 12 core biopsies were taken as described below. The probe was then withdrawn. Patient tolerated the procedure well.     FINDINGS: Digital rectal exam reveals normal prostate. Total volume is 45 mL. Hypoechoic lesions noted bilaterally. US images were obtained.  We then performed site-directed sextant biopsies.  12 cores were taken with 6 on each side, base, mid and apex.    PLAN: Will follow-up next week to review results.    Augustine Delvalle MD  Urology  [facility]

## 2022-09-22 NOTE — PATIENT INSTRUCTIONS
[facility]  Transrectal Ultrasound  Post Operative Information    The physician who performed your Transrectal Ultrasound is Dr. Delvalle (telephone number [phone]).  Please contact this doctor if you have any problems or questions.  If unable to reach your doctor, please return to the Emergency Department.    Take one antibiotic the evening of the procedure and then as directed on your prescription.  Drink at least 6-8 glasses of fluids for the first 48 hours.  Avoid heavy lifting and strenuous activity for 48 hours.  Avoid sexual intercourse for the first 24 hours.  No aspirin or ibuprofen products (Motrin, Advil, Nuprin, ect.) for one week.  You may take acetaminophen (Tylenol) for pain.  You may notice a small amount of blood on the tissue after a bowel movement.  You may pass blood with clots in your urine following the procedure.  The amount will decrease with time but may be visible for up to two weeks.   You make have blood in your semen for 4 weeks after the procedure.  You may experience mild perineal (groin area) discomfort after the procedure.  Please call you doctor if you have any of the follow symptoms:  Fever  Increase in the amount of blood passed  Severe discomfort or pain

## 2022-09-23 ENCOUNTER — PRE VISIT (OUTPATIENT)
Dept: UROLOGY | Facility: CLINIC | Age: 67
End: 2022-09-23

## 2022-09-23 NOTE — TELEPHONE ENCOUNTER
Reason for visit: Review prostate biopsy results     Relevant information: Elevated PSA    Records/imaging/labs/orders: Prostate biopsy results ready?    Pt called: N/A    At Rooming: Standard

## 2022-09-26 LAB
PATH REPORT.COMMENTS IMP SPEC: NORMAL
PATH REPORT.COMMENTS IMP SPEC: NORMAL
PATH REPORT.FINAL DX SPEC: NORMAL
PATH REPORT.GROSS SPEC: NORMAL
PATH REPORT.MICROSCOPIC SPEC OTHER STN: NORMAL
PATH REPORT.RELEVANT HX SPEC: NORMAL
PHOTO IMAGE: NORMAL

## 2022-09-27 ENCOUNTER — OFFICE VISIT (OUTPATIENT)
Dept: UROLOGY | Facility: CLINIC | Age: 67
End: 2022-09-27
Payer: COMMERCIAL

## 2022-09-27 VITALS
HEIGHT: 68 IN | DIASTOLIC BLOOD PRESSURE: 76 MMHG | BODY MASS INDEX: 25.76 KG/M2 | SYSTOLIC BLOOD PRESSURE: 138 MMHG | WEIGHT: 170 LBS | HEART RATE: 51 BPM

## 2022-09-27 DIAGNOSIS — R97.20 ELEVATED PROSTATE SPECIFIC ANTIGEN (PSA): Primary | ICD-10-CM

## 2022-09-27 PROCEDURE — 99213 OFFICE O/P EST LOW 20 MIN: CPT | Performed by: UROLOGY

## 2022-09-27 ASSESSMENT — PAIN SCALES - GENERAL: PAINLEVEL: NO PAIN (0)

## 2022-09-27 NOTE — PATIENT INSTRUCTIONS
Please schedule a 6-month follow up appointment with Dr. Delvalle for PSA recheck.    It was a pleasure meeting with you today.  Thank you for allowing me and my team the privilege of caring for you today.  YOU are the reason we are here, and I truly hope we provided you with the excellent service you deserve.  Please let us know if there is anything else we can do for you so that we can be sure you are leaving completely satisfied with your care experience.

## 2022-09-27 NOTE — PROGRESS NOTES
CHIEF COMPLAINT   It was my pleasure to see Donald Torres who is a 67 year old male for follow-up of Elevated PSA.      HPI  Donald Torres is a very pleasant 67 year old male who presents with a history of Elevated PSA (Prostate Specific Antigen).       He is referred by Dr. Wilkinson who has been following his PSA since it has been first elevated to just over 4ng/ml in 2019. MRI was obtained at that time as well and did not demonstrate a clinically significant lesion. However, his PSA has continued to rise since with a more rapid increase up to ~7-8ng/ml within the past 3 months. Several repeat MRIs were performed which again demonstrated no clinically significant lesion.      Endorses mild hesitancy, weak stream, nocturia (once nightly). No irritatitive symptoms or symptoms of UTI. No hematuria.     No family history of prostate cancer. Father had history of upper tract cancer. Non-smoker.    He has now undergone TRUS biopsy. Tolerated this well with no complications. No malignancy noted.     TRUS Biopsy 9/22/2022  Final Diagnosis   A: Prostate, left base, needle core biopsy:  - Negative for malignancy.     B: Prostate, left mid, needle core biopsy:  - Negative for malignancy.     C: Prostate, left apex, needle core biopsy:  - Negative for malignancy.     D: Prostate, right base, needle core biopsy:  - Negative for malignancy.  - Acute and chronic inflammation present.     E: Prostate, right mid, needle core biopsy:  - Negative for malignancy.     F: Prostate, right apex, needle core biopsy:  - Negative for malignancy.  - Acute and chronic inflammation present.          MRI Prostate 5/26/2022  Size: 52 grams  IMPRESSION:  1. Based on the most suspicious abnormality, this exam is  characterized as PIRADS 2 - Clinically significant cancer is unlikely  to be present.    2. No suspicious adenopathy or evidence of pelvic metastases.    PHYSICAL EXAM  Patient is a 67 year old  male   Vitals: Blood pressure 138/76,  "pulse 51, height 1.727 m (5' 8\"), weight 77.1 kg (170 lb).  General Appearance Adult: Body mass index is 25.85 kg/m .  Alert, no acute distress, oriented  Lungs: no respiratory distress, or pursed lip breathing  Abdomen: soft, nontender, no organomegaly or masses  Back: no CVAT  Neuro: Alert, oriented, speech and mentation normal  Psych: affect and mood normal    Outside and Past Medical records:  Review of the result(s) of each unique test - pathology, MRI, PSA    ASSESSMENT and PLAN  67 year old male with elevated PSA to 7.14. PIRADS 2 on MRI. TRUS biopsy 9/2022 with no malignancy noted. We discussed the nature of PSA and role for ongoing surveillance. Will plan recheck in 6 months.    Follow-up 6 months with PSA    20 minutes spent on the date of the encounter doing chart review, history and exam, documentation and further activities as noted above.    Augustine Delvalle MD  Urology  [facility]    "

## 2022-09-27 NOTE — NURSING NOTE
"Chief Complaint   Patient presents with     Follow Up     Biopsy results       Blood pressure 138/76, pulse 51, height 1.727 m (5' 8\"), weight 77.1 kg (170 lb). Body mass index is 25.85 kg/m .    Patient Active Problem List   Diagnosis     Hyperlipidemia LDL goal <100     Esophageal reflux     Post-nasal drainage     Ganglion cyst of left foot     Fear of public speaking     Adjustment insomnia     Medicare annual wellness visit, subsequent     Elevated prostate specific antigen (PSA)       No Known Allergies    Current Outpatient Medications   Medication Sig Dispense Refill     atorvastatin (LIPITOR) 20 MG tablet Take 1 tablet (20 mg) by mouth daily 90 tablet 3     Calcium Carbonate (CALCIUM 600 PO) Take 600 Units by mouth 2 times daily       cetirizine (ZYRTEC) 10 MG tablet Take 10 mg by mouth daily       famotidine (PEPCID) 20 MG tablet Take 20 mg by mouth 2 times daily       traZODone (DESYREL) 50 MG tablet Take 1 tablet (50 mg) by mouth At Bedtime 90 tablet 2       Social History     Tobacco Use     Smoking status: Never Smoker     Smokeless tobacco: Never Used     Tobacco comment: few cigarettes in high school (less than 10)   Substance Use Topics     Alcohol use: Yes     Comment: glass of wine every other night     Drug use: No       Bridgett Amato  9/27/2022  10:13 AM  "

## 2022-10-10 ENCOUNTER — HEALTH MAINTENANCE LETTER (OUTPATIENT)
Age: 67
End: 2022-10-10

## 2022-11-14 DIAGNOSIS — E78.5 HYPERLIPIDEMIA LDL GOAL <100: ICD-10-CM

## 2022-11-15 RX ORDER — ATORVASTATIN CALCIUM 20 MG/1
20 TABLET, FILM COATED ORAL DAILY
Qty: 90 TABLET | Refills: 0 | Status: SHIPPED | OUTPATIENT
Start: 2022-11-15 | End: 2023-02-21

## 2022-11-15 NOTE — TELEPHONE ENCOUNTER
Last visit 6/3/22, future visit 12/12/22  Medication is being filled for 1 time refill only due to:  Patient needs labs lipids.   Nelsy Rain RN  [facility]

## 2022-12-05 ASSESSMENT — ENCOUNTER SYMPTOMS
HEADACHES: 0
PALPITATIONS: 0
MYALGIAS: 0
COUGH: 0
DIZZINESS: 0
ARTHRALGIAS: 0
DYSURIA: 0
HEMATOCHEZIA: 0
WEAKNESS: 0
FEVER: 0
NERVOUS/ANXIOUS: 0
DIARRHEA: 0
ABDOMINAL PAIN: 0
HEMATURIA: 0
CONSTIPATION: 0
CHILLS: 0
NAUSEA: 0
SHORTNESS OF BREATH: 0
HEARTBURN: 1
JOINT SWELLING: 0
EYE PAIN: 0
PARESTHESIAS: 0
FREQUENCY: 1
SORE THROAT: 0

## 2022-12-05 ASSESSMENT — ACTIVITIES OF DAILY LIVING (ADL): CURRENT_FUNCTION: NO ASSISTANCE NEEDED

## 2022-12-12 ENCOUNTER — OFFICE VISIT (OUTPATIENT)
Dept: FAMILY MEDICINE | Facility: CLINIC | Age: 67
End: 2022-12-12
Payer: COMMERCIAL

## 2022-12-12 VITALS
DIASTOLIC BLOOD PRESSURE: 74 MMHG | OXYGEN SATURATION: 99 % | BODY MASS INDEX: 24.29 KG/M2 | SYSTOLIC BLOOD PRESSURE: 149 MMHG | HEIGHT: 69 IN | HEART RATE: 61 BPM | WEIGHT: 164 LBS | RESPIRATION RATE: 15 BRPM | TEMPERATURE: 96.1 F

## 2022-12-12 DIAGNOSIS — Z23 NEED FOR VACCINATION: ICD-10-CM

## 2022-12-12 DIAGNOSIS — K21.00 GASTROESOPHAGEAL REFLUX DISEASE WITH ESOPHAGITIS WITHOUT HEMORRHAGE: ICD-10-CM

## 2022-12-12 DIAGNOSIS — M54.41 CHRONIC RIGHT-SIDED LOW BACK PAIN WITH RIGHT-SIDED SCIATICA: ICD-10-CM

## 2022-12-12 DIAGNOSIS — F41.8 PERFORMANCE ANXIETY: ICD-10-CM

## 2022-12-12 DIAGNOSIS — E78.5 HYPERLIPIDEMIA LDL GOAL <100: ICD-10-CM

## 2022-12-12 DIAGNOSIS — Z13.6 SCREENING FOR ABDOMINAL AORTIC ANEURYSM: ICD-10-CM

## 2022-12-12 DIAGNOSIS — Z13.1 SCREENING FOR DIABETES MELLITUS: ICD-10-CM

## 2022-12-12 DIAGNOSIS — Z00.00 MEDICARE ANNUAL WELLNESS VISIT, SUBSEQUENT: Primary | ICD-10-CM

## 2022-12-12 DIAGNOSIS — G89.29 CHRONIC RIGHT-SIDED LOW BACK PAIN WITH RIGHT-SIDED SCIATICA: ICD-10-CM

## 2022-12-12 LAB
ALT SERPL W P-5'-P-CCNC: 19 U/L (ref 10–50)
ANION GAP SERPL CALCULATED.3IONS-SCNC: 11 MMOL/L (ref 7–15)
BUN SERPL-MCNC: 19.6 MG/DL (ref 8–23)
CALCIUM SERPL-MCNC: 9.8 MG/DL (ref 8.8–10.2)
CHLORIDE SERPL-SCNC: 105 MMOL/L (ref 98–107)
CHOLEST SERPL-MCNC: 141 MG/DL
CREAT SERPL-MCNC: 0.9 MG/DL (ref 0.67–1.17)
DEPRECATED HCO3 PLAS-SCNC: 27 MMOL/L (ref 22–29)
GFR SERPL CREATININE-BSD FRML MDRD: >90 ML/MIN/1.73M2
GLUCOSE SERPL-MCNC: 85 MG/DL (ref 70–99)
HDLC SERPL-MCNC: 36 MG/DL
LDLC SERPL CALC-MCNC: 83 MG/DL
NONHDLC SERPL-MCNC: 105 MG/DL
POTASSIUM SERPL-SCNC: 3.9 MMOL/L (ref 3.4–5.3)
SODIUM SERPL-SCNC: 143 MMOL/L (ref 136–145)
TRIGL SERPL-MCNC: 109 MG/DL

## 2022-12-12 PROCEDURE — 84460 ALANINE AMINO (ALT) (SGPT): CPT | Performed by: FAMILY MEDICINE

## 2022-12-12 PROCEDURE — 80061 LIPID PANEL: CPT | Performed by: FAMILY MEDICINE

## 2022-12-12 PROCEDURE — 80048 BASIC METABOLIC PNL TOTAL CA: CPT | Performed by: FAMILY MEDICINE

## 2022-12-12 RX ORDER — PROPRANOLOL HYDROCHLORIDE 10 MG/1
TABLET ORAL
Qty: 20 TABLET | Refills: 1 | Status: SHIPPED | OUTPATIENT
Start: 2022-12-12

## 2022-12-12 NOTE — NURSING NOTE
"67 year old  Chief Complaint   Patient presents with     Physical     Wellness visit,   discuss PSA and biopsy report,   also back/leg pain when driving long distances       Blood pressure (!) 145/72, pulse 61, temperature (!) 96.1  F (35.6  C), temperature source Skin, resp. rate 15, height 1.74 m (5' 8.5\"), weight 74.4 kg (164 lb), SpO2 99 %. Body mass index is 24.57 kg/m .  Patient Active Problem List   Diagnosis     Hyperlipidemia LDL goal <100     Esophageal reflux     Post-nasal drainage     Ganglion cyst of left foot     Fear of public speaking     Adjustment insomnia     Medicare annual wellness visit, subsequent     Elevated prostate specific antigen (PSA)       Wt Readings from Last 2 Encounters:   12/12/22 74.4 kg (164 lb)   09/27/22 77.1 kg (170 lb)     BP Readings from Last 3 Encounters:   12/12/22 (!) 145/72   09/27/22 138/76   09/22/22 132/70         Current Outpatient Medications   Medication     atorvastatin (LIPITOR) 20 MG tablet     Calcium Carbonate (CALCIUM 600 PO)     famotidine (PEPCID) 20 MG tablet     traZODone (DESYREL) 50 MG tablet     cetirizine (ZYRTEC) 10 MG tablet     No current facility-administered medications for this visit.       Social History     Tobacco Use     Smoking status: Never     Smokeless tobacco: Never     Tobacco comments:     few cigarettes in high school (less than 10)   Substance Use Topics     Alcohol use: Yes     Comment: 3 x per week     Drug use: No       Health Maintenance Due   Topic Date Due     ADVANCE CARE PLANNING  Never done     ZOSTER IMMUNIZATION (1 of 2) Never done     AORTIC ANEURYSM SCREENING (SYSTEM ASSIGNED)  Never done     INFLUENZA VACCINE (1) 09/01/2022     MEDICARE ANNUAL WELLNESS VISIT  11/16/2022       No results found for: PAP      December 12, 2022 8:21 AM    "

## 2022-12-12 NOTE — PATIENT INSTRUCTIONS
Consider trying over-the-counter saw palmetto for your prostate.  (If you take this, look for a version that is supported by NSF.)  You could also try turmeric.  Please consider getting the 2-shot shingles vaccine series at [facility]

## 2023-01-11 DIAGNOSIS — G47.00 INSOMNIA, UNSPECIFIED TYPE: ICD-10-CM

## 2023-01-11 RX ORDER — TRAZODONE HYDROCHLORIDE 50 MG/1
50 TABLET, FILM COATED ORAL AT BEDTIME
Qty: 90 TABLET | Refills: 3 | Status: SHIPPED | OUTPATIENT
Start: 2023-01-11 | End: 2024-03-11

## 2023-01-11 NOTE — TELEPHONE ENCOUNTER
Medication requested: traZODone (DESYREL) 50 MG tablet  Last office visit: 12/12/22  [facility] appointments: 12/13/23  Medication last refilled: 2/1/22; 90 + 2 refills  Last qualifying labs: N/A    Prescription approved per [facility] Refill Protocol.    Chris LOZANO, RN  01/11/23 12:41 PM

## 2023-02-18 NOTE — PROGRESS NOTES
CHIEF COMPLAINT:  Medicare annual wellness visit, subsequent.    HISTORY OF PRESENT ILLNESS:  Josep is a 67-year-old here for the above.  Overall, he is doing well.  No significant concerns.  We reviewed his medications and took care of refills.  He has some chronic right sided low back pain with some right sided sciatica; GERD with esophagitis but without hemorrhage; occasional performance anxiety; hyperlipidemia.  For the most part, things are stable.    SOCIAL, FAMILY AND PAST SURGICAL HISTORIES:  Reviewed.    HEALTHCARE MAINTENANCE:  Eye care is up to date.  Hearing is fine.  Dental care is up to date.  Blood pressure borderline elevated at 149/74.  He is currently not on antihypertensive medication.  He will check his readings at home.  Body mass index is good.  From an immunization standpoint, he is completely up to date with the exception of not having received a shingles vaccine yet.  He will do so at his pharmacy.  The only thing to do is an abdominal aortic ultrasound to rule out aortic aneurysm.  Labs today will include lipid panel, basic metabolic panel and ALT.  Up to date with colon cancer screening.    MEDICARE QUESTIONS:  No recent falls.  Activities of daily living are unimpaired at home.  No symptoms of depression.  No concerns of memory loss.    OBJECTIVE:  Josep is in absolutely no distress.  Affect is very upbeat.  Alert and oriented x3.  VITAL SIGNS:  Vital signs are stable.  BP slightly elevated at 149/74.  HEENT:  Head is normocephalic, atraumatic.  Ears are free of any cerumen.  The TMs look fine.  There is no pain with palpation of frontal or maxillary sinuses.  Eyes are grossly normal.  Nose and mouth are masked.  NECK:  Supple without any anterior or posterior chain adenopathy.  No visible or palpable thyromegaly.  No carotid bruits are appreciated.   BACK:  Smooth and straight.  LUNGS:  Clear to auscultation bilaterally.  HEART:  Reveals a regular rate and rhythm without  "murmur.  EXTREMITIES:  Ankles are free of any edema.  Good peripheral pulses.   SKIN:  Looks benign.    LABORATORY DATA:  Pending.    ASSESSMENT AND PLAN:   1.  Medicare annual wellness visit, subsequent, up to date with healthcare maintenance strategies.  2.  Flu shot given today.  3.  He needs shingles vaccine series through his pharmacy.  4.  One-time screening for abdominal aortic aneurysm with abdominal aortic ultrasound.  5.  GERD, on famotidine, continue taking that.  6.  History of performance anxiety at times.  He can take propranolol 10 mg as needed.  7.  Hyperlipidemia, on atorvastatin.  Lipid panel and ALT pending.  8.  For some benign prostatic hyperplasia-like symptoms, try over-the-counter saw palmetto.  He also wonders about taking tumeric for overall inflammation and some of his low back pain.  He can certainly go ahead and do that.  Call with any problems or questions.      Answers for HPI/ROS submitted by the patient on 12/5/2022  In general, how would you rate your overall physical health?: good  Frequency of exercise:: 4-5 days/week  Do you usually eat at least 4 servings of fruit and vegetables a day, include whole grains & fiber, and avoid regularly eating high fat or \"junk\" foods? : No  Taking medications regularly:: Yes  Medication side effects:: None, No muscle aches, No significant flushing  Activities of Daily Living: no assistance needed  Home safety: no safety concerns identified  Hearing Impairment:: difficulty following a conversation in a noisy restaurant or crowded room  In the past 6 months, have you been bothered by leaking of urine?: No  abdominal pain: No  Blood in stool: No  Blood in urine: No  chest pain: No  chills: No  congestion: No  constipation: No  cough: No  diarrhea: No  dizziness: No  ear pain: No  eye pain: No  nervous/anxious: No  fever: No  frequency: Yes  genital sores: No  headaches: No  hearing loss: No  heartburn: Yes  arthralgias: No  joint swelling: " No  peripheral edema: No  mood changes: No  myalgias: No  nausea: No  dysuria: No  palpitations: No  Skin sensation changes: No  sore throat: No  urgency: Yes  rash: No  shortness of breath: No  visual disturbance: No  weakness: No  impotence: No  penile discharge: No  In general, how would you rate your overall mental or emotional health?: good  Additional concerns today:: Yes  Duration of exercise:: 15-30 minutes

## 2023-02-20 DIAGNOSIS — E78.5 HYPERLIPIDEMIA LDL GOAL <100: ICD-10-CM

## 2023-02-21 RX ORDER — ATORVASTATIN CALCIUM 20 MG/1
20 TABLET, FILM COATED ORAL DAILY
Qty: 90 TABLET | Refills: 3 | Status: SHIPPED | OUTPATIENT
Start: 2023-02-21 | End: 2024-02-21

## 2023-02-21 NOTE — TELEPHONE ENCOUNTER
Last visit 12/12/22, future appt 12/13/23  Prescription approved per [facility] Refill Protocol.  Nelsy Rain RN  [facility]

## 2023-03-07 ENCOUNTER — PRE VISIT (OUTPATIENT)
Dept: ONCOLOGY | Facility: CLINIC | Age: 68
End: 2023-03-07
Payer: COMMERCIAL

## 2023-03-07 NOTE — TELEPHONE ENCOUNTER
Reason for Visit: Follow up PSA review    Diagnosis: Elevated PSA    Orders/Procedures/Records: PSA lab scheduled    Contact Patient: n/a    Rooming Requirements: normal      Hyacinth Skinner LPN  03/07/23  12:49 PM

## 2023-03-20 ENCOUNTER — LAB (OUTPATIENT)
Dept: LAB | Facility: CLINIC | Age: 68
End: 2023-03-20
Payer: COMMERCIAL

## 2023-03-20 ENCOUNTER — OFFICE VISIT (OUTPATIENT)
Dept: FAMILY MEDICINE | Facility: CLINIC | Age: 68
End: 2023-03-20
Payer: COMMERCIAL

## 2023-03-20 VITALS
HEART RATE: 64 BPM | OXYGEN SATURATION: 98 % | WEIGHT: 163 LBS | SYSTOLIC BLOOD PRESSURE: 150 MMHG | DIASTOLIC BLOOD PRESSURE: 77 MMHG | BODY MASS INDEX: 24.14 KG/M2 | RESPIRATION RATE: 17 BRPM | HEIGHT: 69 IN | TEMPERATURE: 97.9 F

## 2023-03-20 DIAGNOSIS — R97.20 ELEVATED PROSTATE SPECIFIC ANTIGEN (PSA): ICD-10-CM

## 2023-03-20 DIAGNOSIS — S86.811A STRAIN OF RIGHT CALF MUSCLE: Primary | ICD-10-CM

## 2023-03-20 DIAGNOSIS — L98.9 SKIN LESION: ICD-10-CM

## 2023-03-20 DIAGNOSIS — M25.562 LEFT KNEE PAIN, UNSPECIFIED CHRONICITY: ICD-10-CM

## 2023-03-20 LAB — PSA SERPL DL<=0.01 NG/ML-MCNC: 7.86 NG/ML (ref 0–4.5)

## 2023-03-20 PROCEDURE — 36415 COLL VENOUS BLD VENIPUNCTURE: CPT | Performed by: PATHOLOGY

## 2023-03-20 PROCEDURE — 84153 ASSAY OF PSA TOTAL: CPT | Performed by: PATHOLOGY

## 2023-03-20 NOTE — PROGRESS NOTES
Assessment & Plan   Problem List Items Addressed This Visit    None  Visit Diagnoses     Strain of right calf muscle    -  Primary    Relevant Orders    Physical Therapy Referral    Left knee pain, unspecified chronicity        Relevant Orders    Physical Therapy Referral    Skin lesion        Relevant Orders    DESTRUC BENIGN/PREMAL,1ST LESION [39498] (Completed)         Concern for possible DVT given history, but exam today very reassuring against this. PT as ordered, but encouraged patient to contact me here if symptoms declare more forcefully as possible DVT with increasing redness, swelling and pain. Would want to get an U/S in that case.    Patient mentions much older injury with pain at LEFT lateral knee. Hoping PT can take a look at this as well. Briefly discussed possible need for imaging but for now will hold off.    Discussed risks and benefits of cryotherapy for suspected pre-cancerous lesion, including pain, scarring, bleeding, infection. Patient gives verbal consent to proceed with procedure. Liquid nitrogen applied to lesion x2. Patient tolerated the procedure with minimal discomfort. Reviewed care plan with patient as noted in patient instructions. Encouraged patient to return to clinic if lesion returns after healing.     36 minutes spent on the date of the encounter doing chart review, history and exam, documentation and further activities as noted.    Dylan Oro MD  [facility]    Len Car is a 67 year old presenting for the following health issues:  Musculoskeletal Problem (Right calf muscle pain started about a week ago, out skiing in [place] -- doesn't remember anything that would have caused it. Still feeling tender. When getting out of bed in the morning has a hard time walking, hobbling around. After he is up for a while and it's stretched out it gets better.) and Derm Problem (Sore on head -- would like looked at, bump on the back of head that isn't sore.)      HPI   Pulled  "calf muscle  - out skiing about a week ago  - was tender after a day of skiing although he doesn't remember a specific injury  - the next day was in a car, driving, for 10 hours and at the end of the drive the calf was very sore  - since then, calf continues to feel very tight and sore in the mornings, but by the end of the day it can feel almost back to normal  - no redness, warmth or swelling at the site  - no history of blood clots, no chest tightness or shortness of breath    Check sore on head  - been there for about a year  - can't really see it to know if it has changed much  - no bleeding or pain  - just thought he should get it checked since he is here      Review of Systems   Constitutional, HEENT, cardiovascular, pulmonary, gi and gu systems are negative, except as otherwise noted.      Objective    BP (!) 150/77 (BP Location: Left arm, Patient Position: Sitting, Cuff Size: Adult Regular)   Pulse 64   Temp 97.9  F (36.6  C) (Temporal)   Resp 17   Ht 1.74 m (5' 8.5\")   Wt 73.9 kg (163 lb)   SpO2 98%   BMI 24.42 kg/m    Body mass index is 24.42 kg/m .  Physical Exam   GENERAL: healthy, alert and no distress  NECK: no adenopathy, no asymmetry, masses, or scars and thyroid normal to palpation  RESP: lungs clear to auscultation - no rales, rhonchi or wheezes  CV: regular rate and rhythm, normal S1 S2, no S3 or S4, no murmur, click or rub, no peripheral edema and peripheral pulses strong  ABDOMEN: soft, nontender, no hepatosplenomegaly, no masses and bowel sounds normal  MS: no gross musculoskeletal defects noted, no edema            "

## 2023-03-20 NOTE — NURSING NOTE
"67 year old  Chief Complaint   Patient presents with     Musculoskeletal Problem     Right calf muscle pain started about a week ago, out skiing in [place] -- doesn't remember anything that would have caused it. Still feeling tender. When getting out of bed in the morning has a hard time walking, hobbling around. After he is up for a while and it's stretched out it gets better.     Derm Problem     Sore on head -- would like looked at, bump on the back of head that isn't sore.       Blood pressure (!) 150/77, pulse 64, temperature 97.9  F (36.6  C), temperature source Temporal, resp. rate 17, height 1.74 m (5' 8.5\"), weight 73.9 kg (163 lb), SpO2 98 %. Body mass index is 24.42 kg/m .  Patient Active Problem List   Diagnosis     Hyperlipidemia LDL goal <100     Esophageal reflux     Post-nasal drainage     Ganglion cyst of left foot     Fear of public speaking     Adjustment insomnia     Medicare annual wellness visit, subsequent     Elevated prostate specific antigen (PSA)     Chronic right-sided low back pain with right-sided sciatica     Performance anxiety       Wt Readings from Last 2 Encounters:   03/20/23 73.9 kg (163 lb)   12/12/22 74.4 kg (164 lb)     BP Readings from Last 3 Encounters:   03/20/23 (!) 150/77   12/12/22 (!) 149/74   09/27/22 138/76         Current Outpatient Medications   Medication     atorvastatin (LIPITOR) 20 MG tablet     Calcium Carbonate (CALCIUM 600 PO)     famotidine (PEPCID) 20 MG tablet     propranolol (INDERAL) 10 MG tablet     traZODone (DESYREL) 50 MG tablet     No current facility-administered medications for this visit.       Social History     Tobacco Use     Smoking status: Never     Smokeless tobacco: Never     Tobacco comments:     few cigarettes in high school (less than 10)   Substance Use Topics     Alcohol use: Yes     Comment: 3 x per week     Drug use: No       Health Maintenance Due   Topic Date Due     ADVANCE CARE PLANNING  Never done     ZOSTER IMMUNIZATION (1 of 2) " Never done     AORTIC ANEURYSM SCREENING (SYSTEM ASSIGNED)  Never done       No results found for: PAP      March 20, 2023 10:26 AM

## 2023-03-21 ENCOUNTER — OFFICE VISIT (OUTPATIENT)
Dept: UROLOGY | Facility: CLINIC | Age: 68
End: 2023-03-21
Payer: COMMERCIAL

## 2023-03-21 VITALS
BODY MASS INDEX: 24.14 KG/M2 | DIASTOLIC BLOOD PRESSURE: 77 MMHG | HEART RATE: 67 BPM | HEIGHT: 69 IN | SYSTOLIC BLOOD PRESSURE: 160 MMHG | WEIGHT: 163 LBS

## 2023-03-21 DIAGNOSIS — R97.20 ELEVATED PROSTATE SPECIFIC ANTIGEN (PSA): Primary | ICD-10-CM

## 2023-03-21 PROCEDURE — 99213 OFFICE O/P EST LOW 20 MIN: CPT | Mod: GC | Performed by: UROLOGY

## 2023-03-21 ASSESSMENT — PAIN SCALES - GENERAL: PAINLEVEL: NO PAIN (0)

## 2023-03-21 NOTE — PROGRESS NOTES
CHIEF COMPLAINT   It was my pleasure to see Donald Torres who is a 67 year old male for follow-up of Elevated PSA.    HPI   Donald Torres is a very pleasant 67 year old male who presents with a history of Elevated PSA (Prostate Specific Antigen).      He was originally referred by Dr. Wilkinson who has been following his PSA since it has been first elevated to just over 4ng/ml in 2019. MRI was obtained at that time as well and did not demonstrate a clinically significant lesion. However, his PSA has continued to rise since with a more rapid increase up to ~7-8ng/ml at last check. Several repeat MRIs were performed which again demonstrated no clinically significant lesion. A TRUS biopsy was performed in 9/2022 that was benign. He presents for follow-up on his PSA which is 7.86 from 7.14 6 months ago.      Continues to endorses mild hesitancy, weak stream, nocturia (once nightly). No irritatitive symptoms or symptoms of UTI. No hematuria.     Family history: No family history of prostate cancer. Father had history of upper tract cancer. Non-smoker.    PSA  3/20/23: 7.86  8/01/22: 7.14  4/26/22: 8.56  11/16/21: 6.18  9/17/20: 4.97  10/22/19: 4.85  7/15/19: 4.27  7/13/17: 3.52    TRUS Biopsy 9/22/2022  Final Diagnosis   A: Prostate, left base, needle core biopsy:  - Negative for malignancy.     B: Prostate, left mid, needle core biopsy:  - Negative for malignancy.     C: Prostate, left apex, needle core biopsy:  - Negative for malignancy.     D: Prostate, right base, needle core biopsy:  - Negative for malignancy.  - Acute and chronic inflammation present.     E: Prostate, right mid, needle core biopsy:  - Negative for malignancy.     F: Prostate, right apex, needle core biopsy:  - Negative for malignancy.  - Acute and chronic inflammation present.           MRI Prostate 5/26/2022  Size: 52 grams  IMPRESSION:  1. Based on the most suspicious abnormality, this exam is  characterized as PIRADS 2 - Clinically  "significant cancer is unlikely  to be present.    2. No suspicious adenopathy or evidence of pelvic metastases.    PHYSICAL EXAM  Patient is a 67 year old  male   Vitals: Blood pressure (!) 160/77, pulse 67, height 1.74 m (5' 8.5\"), weight 73.9 kg (163 lb).  General Appearance Adult: Body mass index is 24.42 kg/m .  Alert, no acute distress, oriented  Lungs: no respiratory distress, or pursed lip breathing  Abdomen: soft, non distended  Neuro: Alert, oriented, speech and mentation normal  Psych: affect and mood normal  : deferred    Outside and Past Medical records:  Review of the result(s) of each unique test - pathology, MRI, PSA    ASSESSMENT and PLAN  68yo M with hx of elevated PSA. His PSA was elevated at ~7-8 so an MRI and prostate biopsy was obtained. His MRI demonstrated no suspicious lesions and his biopsy showed no evidence of malignancy. His repeat PSA today remains stable.  He is otherwise asymptomatic. Given that his PSA is roughly the same over the last year and his negative biopsy will have him follow-up in a year with a PSA. He would like to continue to follow with urology so will see him back around that time.     - PSA and follow-up in a year    Jeff Pizarro MD  Urology Resident    Attestation:  I, Augustine Delvalle MD, saw this patient with the resident and agree with the resident's findings and plan of care. I personally reviewed the medications, vital signs, labs, and imaging. I have reviewed and edited the note above to reflect my findings. The physical exam and any procedures were performed by me and the pertinant details are outlined above.    20 minutes spent on the date of the encounter doing chart review, history and exam, documentation and further activities as noted above.    Augustine Delvalle MD  Urology  [facility]    "

## 2023-04-12 ENCOUNTER — THERAPY VISIT (OUTPATIENT)
Dept: PHYSICAL THERAPY | Facility: CLINIC | Age: 68
End: 2023-04-12
Attending: FAMILY MEDICINE
Payer: COMMERCIAL

## 2023-04-12 DIAGNOSIS — M25.562 LEFT KNEE PAIN, UNSPECIFIED CHRONICITY: ICD-10-CM

## 2023-04-12 DIAGNOSIS — S86.811A STRAIN OF RIGHT CALF MUSCLE: ICD-10-CM

## 2023-04-12 PROCEDURE — 97140 MANUAL THERAPY 1/> REGIONS: CPT | Mod: GP | Performed by: PHYSICAL THERAPIST

## 2023-04-12 PROCEDURE — 97161 PT EVAL LOW COMPLEX 20 MIN: CPT | Mod: GP | Performed by: PHYSICAL THERAPIST

## 2023-04-12 PROCEDURE — 97110 THERAPEUTIC EXERCISES: CPT | Mod: GP | Performed by: PHYSICAL THERAPIST

## 2023-04-12 ASSESSMENT — ACTIVITIES OF DAILY LIVING (ADL)
PAIN: I HAVE THE SYMPTOM BUT IT DOES NOT AFFECT MY ACTIVITY
HOW_WOULD_YOU_RATE_THE_CURRENT_FUNCTION_OF_YOUR_KNEE_DURING_YOUR_USUAL_DAILY_ACTIVITIES_ON_A_SCALE_FROM_0_TO_100_WITH_100_BEING_YOUR_LEVEL_OF_KNEE_FUNCTION_PRIOR_TO_YOUR_INJURY_AND_0_BEING_THE_INABILITY_TO_PERFORM_ANY_OF_YOUR_USUAL_DAILY_ACTIVITIES?: 80
WALK: ACTIVITY IS NOT DIFFICULT
STIFFNESS: I DO NOT HAVE THE SYMPTOM
WEAKNESS: I HAVE THE SYMPTOM BUT IT DOES NOT AFFECT MY ACTIVITY
SIT WITH YOUR KNEE BENT: ACTIVITY IS NOT DIFFICULT
GO UP STAIRS: ACTIVITY IS MINIMALLY DIFFICULT
LIMPING: I HAVE THE SYMPTOM BUT IT DOES NOT AFFECT MY ACTIVITY
KNEE_ACTIVITY_OF_DAILY_LIVING_SUM: 58
KNEE_ACTIVITY_OF_DAILY_LIVING_SCORE: 82.86
AS_A_RESULT_OF_YOUR_KNEE_INJURY,_HOW_WOULD_YOU_RATE_YOUR_CURRENT_LEVEL_OF_DAILY_ACTIVITY?: NEARLY NORMAL
KNEEL ON THE FRONT OF YOUR KNEE: ACTIVITY IS VERY DIFFICULT
SQUAT: ACTIVITY IS MINIMALLY DIFFICULT
GIVING WAY, BUCKLING OR SHIFTING OF KNEE: I DO NOT HAVE THE SYMPTOM
SWELLING: I DO NOT HAVE THE SYMPTOM
STAND: ACTIVITY IS NOT DIFFICULT
HOW_WOULD_YOU_RATE_THE_OVERALL_FUNCTION_OF_YOUR_KNEE_DURING_YOUR_USUAL_DAILY_ACTIVITIES?: NEARLY NORMAL
RAW_SCORE: 58
GO DOWN STAIRS: ACTIVITY IS SOMEWHAT DIFFICULT
RISE FROM A CHAIR: ACTIVITY IS MINIMALLY DIFFICULT

## 2023-04-12 NOTE — PROGRESS NOTES
Physical Therapy Initial Evaluation  Subjective:    Patient Health History  Donald Torres being seen for Injury to my right calf muscle. Strain on left knee from skiing..     Problem began: 3/10/2023.   Problem occurred: Downhill skiing     General health as reported by patient is good.  Pertinent medical history includes: none. Other medical history details: Current.     Medical allergies: none.   Surgeries include:  Other. Other surgery history details: Hernia, appendectomy, tonsils.    Current medications:  Sleep medication and other. Other medications details: Lipator.       Primary job tasks include:  Other.   Other job/home tasks details: Retired.                [facility]  Physical Therapy Initial Examination/Evaluation  April 12, 2023    Donald Torres is a 67 year old  male referred to physical therapy by Dr. Oro for treatment of calf strain with Precautions/Restrictions/MD instructions none    KEY PT FINDINGS:  1.  Dynamic valgus with SL squat  2.  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      Strength:    Dorsiflexion:  Left: 5/5      Pain:-   Right: 5/5    Pain:-  Plantarflexion: Left: 5/5    Pain:-   Right: 5/5   Pain:-  Inversion:Left: 5/5   Pain:-     Right: 5/5   Pain:-  Eversion:Left: 5/5   Pain:-  Right: 5/5   Pain:-              Strength wnl ankle: gluteus medius 5-/5 B.      PALPATION:     Right ankle tenderness present at:   gastroc/soleus  EDEMA: normal          MOBILITY TESTING: normal                                                            Excellent DL squat mech; dynamic valgus with SL squat    General     ROS    Assessment/Plan      Patient is a 67 year old male with right side ankle complaints.    Patient has the following significant findings with corresponding treatment plan.                Diagnosis 1:  Calf strain    Pain -  hot/cold therapy, US, electric stimulation, manual therapy, splint/taping/bracing/orthotics, self management, education, and home program  Decreased ROM/flexibility - manual therapy and therapeutic exercise  Decreased joint mobility - manual therapy and therapeutic exercise  Decreased strength - therapeutic exercise and therapeutic activities  Impaired balance - neuro re-education and therapeutic activities  Decreased proprioception - neuro re-education and therapeutic activities  Inflammation - cold therapy  Edema - vasopneumatics  Impaired gait - gait training  Impaired muscle performance - neuro re-education  Decreased function - therapeutic activities    Therapy Evaluation Codes:   1) History comprised of:   Personal factors that impact the plan of care:      None.    Comorbidity factors that impact the plan of care are:      None.     Medications impacting care: None.  2) Examination of Body Systems comprised of:   Body structures and functions that impact the plan of care:      Ankle.   Activity limitations that impact the plan of care are:      Running, Sports, Squatting/kneeling, Stairs and Standing.  3) Clinical  presentation characteristics are:   Stable/Uncomplicated.  4) Decision-Making    Low complexity using standardized patient assessment instrument and/or measureable assessment of functional outcome.  Cumulative Therapy Evaluation is: Low complexity.    Previous and current functional limitations:  (See Goal Flow Sheet for this information)    Short term and Long term goals: (See Goal Flow Sheet for this information)     Communication ability:  Patient appears to be able to clearly communicate and understand verbal and written communication and follow directions correctly.  Treatment Explanation - The following has been discussed with the patient:   RX ordered/plan of care  Anticipated outcomes  Possible risks and side effects  This patient would benefit from PT intervention to resume normal activities.   Rehab potential is good.    Frequency:  2 X a month, once daily  Duration:  for 3 months  Discharge Plan:  Achieve all LTG.  Independent in home treatment program.  Reach maximal therapeutic benefit.    Please refer to the daily flowsheet for treatment today, total treatment time and time spent performing 1:1 timed codes.

## 2023-05-02 ENCOUNTER — THERAPY VISIT (OUTPATIENT)
Dept: PHYSICAL THERAPY | Facility: CLINIC | Age: 68
End: 2023-05-02
Payer: COMMERCIAL

## 2023-05-02 DIAGNOSIS — S86.811A STRAIN OF RIGHT CALF MUSCLE: Primary | ICD-10-CM

## 2023-05-02 PROCEDURE — 97112 NEUROMUSCULAR REEDUCATION: CPT | Mod: GP | Performed by: PHYSICAL THERAPIST

## 2023-05-02 PROCEDURE — 97110 THERAPEUTIC EXERCISES: CPT | Mod: GP | Performed by: PHYSICAL THERAPIST

## 2023-05-13 ENCOUNTER — TELEPHONE (OUTPATIENT)
Dept: NURSING | Facility: CLINIC | Age: 68
End: 2023-05-13
Payer: COMMERCIAL

## 2023-05-13 NOTE — TELEPHONE ENCOUNTER
Patient calling.    Patient requesting 4 days worth of his Atorvastatin and trazodone medication, as he is currently in [place] and had forgotten his meds at home. Reviewed meds -  Advised to contact his pharmacy [facility] for a bridge and let patient know he may need to pay out of pocket. Patient verbalized understanding.    Elisa Pierre RN on 5/13/2023 at 9:14 AM

## 2023-05-30 NOTE — PROGRESS NOTES
[facility]    OUTPATIENT Physical Therapy ORTHOPEDIC EVALUATION  PLAN OF TREATMENT FOR OUTPATIENT REHABILITATION  (COMPLETE FOR INITIAL CLAIMS ONLY)  Patient's Last Name, First Name, M.I.  YOB: 1955  Donald Torres    Provider s Name:  [facility]   Medical Record No.  [id number]   Start of Care Date:  04/12/23   Onset Date:   03/20/23   Treatment Diagnosis:  R medial gastrocnemius strain Medical Diagnosis:     Strain of right calf muscle  Left knee pain, unspecified chronicity       Goals:     04/12/23 0500   Body Part   Goals listed below are for calf   Goal #1   Goal #1 ambulation   Previous Functional Level No restrictions   Performance Level PL 5/10 in AM with ambulation   Performance Level reduce PL to 3/10 or less in AM with ambulation   Rationale to maintain proper body mechanics/posture while ambulating to avoid additional compensatory injury due to improper gait mechanics   Due Date 05/03/23   Performance Level PL 1/10 or less with ambulation in AM   Rationale to maintain proper body mechanics/posture while ambulating to avoid additional compensatory injury due to improper gait mechanics   Due Date 06/11/23         Therapy Frequency:  2xmonth  Predicted Duration of Therapy Intervention:  3 months    Luli Vaughan, SARAY                 I CERTIFY THE NEED FOR THESE SERVICES FURNISHED UNDER        THIS PLAN OF TREATMENT AND WHILE UNDER MY CARE .             Physician Signature               Date    X_____________________________________________________                         Certification Date From:  04/12/23   Certification Date To:  07/10/23    Referring Provider:  Dylan Oro    Initial Assessment        See Epic Evaluation SOC Date: 04/12/23

## 2023-06-01 ENCOUNTER — THERAPY VISIT (OUTPATIENT)
Dept: PHYSICAL THERAPY | Facility: CLINIC | Age: 68
End: 2023-06-01
Payer: COMMERCIAL

## 2023-06-01 DIAGNOSIS — S86.811A STRAIN OF RIGHT CALF MUSCLE: Primary | ICD-10-CM

## 2023-06-01 PROCEDURE — 97140 MANUAL THERAPY 1/> REGIONS: CPT | Mod: GP | Performed by: PHYSICAL THERAPIST

## 2023-06-01 PROCEDURE — 97110 THERAPEUTIC EXERCISES: CPT | Mod: GP | Performed by: PHYSICAL THERAPIST

## 2023-06-01 NOTE — PROGRESS NOTES
"   06/01/23 0500   Appointment Info   Signing clinician's name / credentials Luli Vaughan, DPT, OCS   Total/Authorized Visits 6   Visits Used 3   Medical Diagnosis calf strain   PT Tx Diagnosis lower leg pain   Progress Note/Certification   Therapy Frequency 2xmonth   GOALS   PT Goals 2   PT Goal 2   Goal Description Ascend and descend stairs in a normal reciprocal pattern   Rationale to maximize safety and independence with performance of ADLs and functional tasks;to maximize safety and independence within the home;to maximize safety and independence within the community;to maximize safety and independence with transportation   Target Date 06/01/23   Date Met 06/01/23   Subjective Report   Subjective Report Patient reports that his calf is only bothersome when he pushes on it.  Notices a difference in regard to strength - SL toe raise 35 reps on R, 50 on L.   Objective Measures   Objective Measures Objective Measure 1   Objective Measure 1   Objective Measure Mild TTP medial gastroc   Treatment Interventions (PT)   Interventions Therapeutic Procedure/Exercise;Manual Therapy   Therapeutic Procedure/Exercise   PTRx Ther Proc 1 Toe Raises   PTRx Ther Proc 1 - Details 10 reps   PTRx Ther Proc 2 Knee Single Leg Squat   PTRx Ther Proc 2 - Details 10 reps  (NMR)   PTRx Ther Proc 3 Standing Gastroc Stretch   PTRx Ther Proc 3 - Details 2x20\"   PTRx Ther Proc 4 Standing Soleus Stretch   PTRx Ther Proc 4 - Details 2x20\"   PTRx Ther Proc 5 Supine Abdominal Exercise #9A (Arm/Leg Extension With Feet Off the Floor)   PTRx Ther Proc 5 - Details rev   PTRx Ther Proc 6 Supine Abdominal Exercise #10 (Bilateral Arm and Leg Extension)   PTRx Ther Proc 6 - Details rev   Therapeutic Procedures: strength, endurance, ROM, flexibillity minutes (45112) 10   Manual Therapy   Manual Therapy: Mobilization, MFR, MLD, friction massage minutes (92630) 10   Manual Therapy 1 gastroc medially   Total Session Time   Timed Code Treatment Minutes " 20   Total Treatment Time (sum of timed and untimed services) 20       DISCHARGE  Reason for Discharge: Patient has met all goals.    Equipment Issued: none    Discharge Plan: Patient to continue home program.    Referring Provider:  Dylan Oro

## 2023-12-06 ASSESSMENT — ENCOUNTER SYMPTOMS
ARTHRALGIAS: 0
CHILLS: 0
NERVOUS/ANXIOUS: 1
SHORTNESS OF BREATH: 0
COUGH: 0
PALPITATIONS: 0
CONSTIPATION: 0
EYE PAIN: 0
PARESTHESIAS: 0
HEMATURIA: 0
HEADACHES: 0
DYSURIA: 0
ABDOMINAL PAIN: 0
DIZZINESS: 0
MYALGIAS: 0
HEARTBURN: 1
NAUSEA: 0
FREQUENCY: 0
HEMATOCHEZIA: 0
SORE THROAT: 0
JOINT SWELLING: 0
DIARRHEA: 0
WEAKNESS: 0
FEVER: 0

## 2023-12-06 ASSESSMENT — ACTIVITIES OF DAILY LIVING (ADL): CURRENT_FUNCTION: NO ASSISTANCE NEEDED

## 2023-12-11 NOTE — PATIENT INSTRUCTIONS
Preventive Health Recommendations:     See your health care provider every year to  Review health changes.   Discuss preventive care.    Review your medicines if your doctor has prescribed any.    Talk with your health care provider about whether you should have a test to screen for prostate cancer (PSA).  Every 3 years, have a diabetes test (fasting glucose). If you are at risk for diabetes, you should have this test more often.  Every 5 years, have a cholesterol test. Have this test more often if you are at risk for high cholesterol or heart disease.   Every 10 years, have a colonoscopy. Or, have a yearly FIT test (stool test). These exams will check for colon cancer.  Talk to with your health care provider about screening for Abdominal Aortic Aneurysm if you have a family history of AAA or have a history of smoking.    Shots:   Get a flu shot each year.   Get a tetanus shot every 10 years.   Talk to your doctor about your pneumonia vaccines. There are now two you should receive - Pneumovax (PPSV 23) and Prevnar (PCV 13).   Talk to your pharmacist about a shingles vaccine.   Talk to your doctor about the hepatitis B vaccine.  Nutrition:   Eat at least 5 servings of fruits and vegetables each day.   Eat whole-grain bread, whole-wheat pasta and brown rice instead of white grains and rice.   Get adequate Calcium and Vitamin D.   Lifestyle  Exercise for at least 150 minutes a week (30 minutes a day, 5 days a week). This will help you control your weight and prevent disease.   Limit alcohol to one drink per day.   No smoking.   Wear sunscreen to prevent skin cancer.  See your dentist every six months for an exam and cleaning.  See your eye doctor every 1 to 2 years to screen for conditions such as glaucoma, macular degeneration, cataracts, etc.    Personalized Prevention Plan  You are due for the preventive services outlined below.  Your care team is available to assist you in scheduling these services.  If you have  already completed any of these items, please share that information with your care team to update in your medical record.  Health Maintenance Due   Topic Date Due     Discuss Advance Care Planning  Never done     Zoster (Shingles) Vaccine (1 of 2) Never done     Polio Vaccine (2 of 3 - Adult catch-up series) 10/17/2006     RSV VACCINE (Pregnancy & 60+) (1 - 1-dose 60+ series) Never done     AORTIC ANEURYSM SCREENING (SYSTEM ASSIGNED)  Never done     Flu Vaccine (1) 09/01/2023     COVID-19 Vaccine (6 - 2023-24 season) 09/01/2023     FALL RISK ASSESSMENT  12/12/2023     Colorectal Cancer Screening  12/18/2023

## 2023-12-13 ENCOUNTER — OFFICE VISIT (OUTPATIENT)
Dept: FAMILY MEDICINE | Facility: CLINIC | Age: 68
End: 2023-12-13
Payer: COMMERCIAL

## 2023-12-13 VITALS
OXYGEN SATURATION: 100 % | TEMPERATURE: 97.2 F | BODY MASS INDEX: 24.1 KG/M2 | WEIGHT: 159 LBS | SYSTOLIC BLOOD PRESSURE: 128 MMHG | DIASTOLIC BLOOD PRESSURE: 80 MMHG | HEIGHT: 68 IN | HEART RATE: 55 BPM

## 2023-12-13 DIAGNOSIS — E78.5 HYPERLIPIDEMIA LDL GOAL <100: ICD-10-CM

## 2023-12-13 DIAGNOSIS — Z00.00 MEDICARE ANNUAL WELLNESS VISIT, SUBSEQUENT: Primary | ICD-10-CM

## 2023-12-13 DIAGNOSIS — Z13.1 SCREENING FOR DIABETES MELLITUS: ICD-10-CM

## 2023-12-13 DIAGNOSIS — R23.3 EASY BRUISABILITY: ICD-10-CM

## 2023-12-13 LAB
ERYTHROCYTE [DISTWIDTH] IN BLOOD BY AUTOMATED COUNT: 13.2 % (ref 10–15)
HCT VFR BLD AUTO: 42 % (ref 40–53)
HGB BLD-MCNC: 14.3 G/DL (ref 13.3–17.7)
MCH RBC QN AUTO: 30.8 PG (ref 26.5–33)
MCHC RBC AUTO-ENTMCNC: 34 G/DL (ref 31.5–36.5)
MCV RBC AUTO: 90 FL (ref 78–100)
PLATELET # BLD AUTO: 250 10E3/UL (ref 150–450)
RBC # BLD AUTO: 4.65 10E6/UL (ref 4.4–5.9)
WBC # BLD AUTO: 5.8 10E3/UL (ref 4–11)

## 2023-12-13 NOTE — NURSING NOTE
"Josep  68 year old    Chief Complaint   Patient presents with    Wellness Visit    Musculoskeletal Problem     When grabbing or carrying heavier items, blood vessels pop in his fingers on both hands causing soreness    Sore/inflamed R wrist    Derm Problem     Liquid nitrogen was used on top of his head to remove a lesion at last visit with Dr. Oro, pt has noticed the area around it is raised            Blood pressure 128/80, pulse 55, temperature 97.2  F (36.2  C), temperature source Skin, height 1.734 m (5' 8.25\"), weight 72.1 kg (159 lb), SpO2 100%. Body mass index is 24 kg/m .    Patient Active Problem List   Diagnosis    Hyperlipidemia LDL goal <100    Esophageal reflux    Post-nasal drainage    Ganglion cyst of left foot    Fear of public speaking    Adjustment insomnia    Medicare annual wellness visit, subsequent    Elevated prostate specific antigen (PSA)    Chronic right-sided low back pain with right-sided sciatica    Performance anxiety    Strain of right calf muscle              Wt Readings from Last 2 Encounters:   12/13/23 72.1 kg (159 lb)   03/21/23 73.9 kg (163 lb)       BP Readings from Last 3 Encounters:   12/13/23 128/80   03/21/23 (!) 160/77   03/20/23 (!) 150/77                Current Outpatient Medications   Medication    atorvastatin (LIPITOR) 20 MG tablet    Calcium Carbonate (CALCIUM 600 PO)    famotidine (PEPCID) 20 MG tablet    propranolol (INDERAL) 10 MG tablet    traZODone (DESYREL) 50 MG tablet     No current facility-administered medications for this visit.              Social History     Tobacco Use    Smoking status: Never    Smokeless tobacco: Never    Tobacco comments:     few cigarettes in high school (less than 10)   Substance Use Topics    Alcohol use: Yes     Alcohol/week: 2.0 standard drinks of alcohol     Types: 2 Standard drinks or equivalent per week    Drug use: No              Health Maintenance Due   Topic Date Due    ADVANCE CARE PLANNING  Never done    IPV " "IMMUNIZATION (2 of 3 - Adult catch-up series) 10/17/2006    RSV VACCINE (Pregnancy & 60+) (1 - 1-dose 60+ series) Never done    AORTIC ANEURYSM SCREENING (SYSTEM ASSIGNED)  Never done    INFLUENZA VACCINE (1) 09/01/2023    COVID-19 Vaccine (6 - 2023-24 season) 09/01/2023    ZOSTER IMMUNIZATION (2 of 2) 11/20/2023    COLORECTAL CANCER SCREENING  12/18/2023            No results found for: \"PAP\"           December 13, 2023 12:56 PM    "

## 2023-12-14 LAB
ALT SERPL W P-5'-P-CCNC: 19 U/L (ref 0–70)
ANION GAP SERPL CALCULATED.3IONS-SCNC: 10 MMOL/L (ref 7–15)
BUN SERPL-MCNC: 23.5 MG/DL (ref 8–23)
CALCIUM SERPL-MCNC: 9.4 MG/DL (ref 8.8–10.2)
CHLORIDE SERPL-SCNC: 105 MMOL/L (ref 98–107)
CHOLEST SERPL-MCNC: 144 MG/DL
CREAT SERPL-MCNC: 0.85 MG/DL (ref 0.67–1.17)
DEPRECATED HCO3 PLAS-SCNC: 27 MMOL/L (ref 22–29)
EGFRCR SERPLBLD CKD-EPI 2021: >90 ML/MIN/1.73M2
FASTING STATUS PATIENT QL REPORTED: NO
GLUCOSE SERPL-MCNC: 83 MG/DL (ref 70–99)
HDLC SERPL-MCNC: 49 MG/DL
LDLC SERPL CALC-MCNC: 69 MG/DL
NONHDLC SERPL-MCNC: 95 MG/DL
POTASSIUM SERPL-SCNC: 4.6 MMOL/L (ref 3.4–5.3)
SODIUM SERPL-SCNC: 142 MMOL/L (ref 135–145)
TRIGL SERPL-MCNC: 132 MG/DL

## 2023-12-16 NOTE — PROGRESS NOTES
Chief complaint: Medicare annual wellness visit, subsequent    HPI: Josep is a 68-year-old here for the above.  Overall, he is doing quite well.    He had a couple of musculoskeletal questions.  The first is that when he grabs or carries a heavier item, he will note that the blood vessels seem to pop on the flexor surfaces of his hands.  He did this recently and saw some black and blue discoloration on the flexor surface of the fifth finger.  Typically, no significant swelling.  There is no numbness.  He has not dropped anything.  There is no real pain associated with it.    In addition, his right wrist is a bit sore.  This can also be painful he is lifting up something.  I will describe my findings in both of these cases below.    Also, he feels that his ankles are quite prominent.  He is unable to wear ice skates at this time.  He likes to ski.  Ski boots are quite tight.    He had a lesion frozen on the top of his head and he can feel little raised area around it.  He wanted me to take  A look at it.    Healthcare maintenance:    He wears glasses.  Eyes are checked annually.  No change in his vision.  From a hearing standpoint, generally he is doing well but it is quite hard for him to hear in restaurants. he really does not want to wear hearing aids at this time and therefore we might go ahead and check a baseline audiogram next year when he turns 69.  Dental care is up-to-date.  Blood pressure 128/80.  Body mass index is 24.  Immunizations: He will get both a flu shot and COVID-vaccine after an upcoming presentation.  Otherwise, generally up-to-date with immunization recommendations.  Will go ahead and check some labs as indicated below.  Up-to-date with colon cancer screening.  he is followed by urology regarding his prostate and will have a PSA level done in March.    Medicare questions: No falls at home in the last year.  No problems with activities of daily living.  No symptoms of depression.  Mini-Cog is 5  out of 5.    Objective:    Josep is in no distress.  Alert and oriented x 3.  /80 with a pulse of 55 and regular.  Temperature is 97.2.  He is 5 feet 8.25 inches in height weighs 159 pounds with a BMI of 24.  O2 sats are 100% on room air.    HEENT head is normocephalic atraumatic.  I can see where the spot was frozen on the top of his head and it seems like it was successful.  I do not see an active actinic keratosis in that location.  The skin is healed.  Eyes are grossly normal.  No pain with palpation of the frontal or maxillary sinuses.  Neck is supple without any anterior posterior chain adenopathy.  No visible or palpable thyromegaly.  No carotid bruits are appreciated.  Back is smooth and straight.  Lungs are clear to auscultation bilaterally.  Heart reveals a regular rate and rhythm without murmur.  Examination of the right wrist reveals a positive Finkelstein test.  Strength is normal.  Examination of the hands reveals no current ecchymoses.  No swelling.  Not mentioned above, but he denies any epistaxis, difficulty with clotting with things like a paper cut, and no hematuria or hematochezia.    Laboratory studies: Basic metabolic panel, lipid panel, ALT, and CBC with platelets pending.    Assessment/plan:    Josep is a 68-year-old here today for his annual Medicare wellness visit.  Up-to-date with healthcare maintenance strategies.  COVID and flu vaccines will be obtained in the near future.    Followed by urology for his prostate.  PSA pending there.    I will contact him with all of his laboratory studies.    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  I will have him try some over-the-counter Voltaren 1% gel up to 4 times daily for couple weeks.  If that does not work, we could consider a thumb spica splint that he can wear at night.  We can also refer him to hand therapy if and when necessary.    Easy bruisability.  I suspect is simply due to vessel fragility as he ages.  Will make sure that his  "platelet count is fine.  I am not suspecting a bleeding disorder at this point.    History of actinic keratosis, scalp.  Healed.    Prominent ankles, benign.  Reassurance given.    Reach out with any questions or concerns.  I look forward to seeing him back in approximately 1 year for his next Medicare annual wellness visit.      Answers submitted by the patient for this visit:  Annual Preventive Visit (Submitted on 12/6/2023)  Chief Complaint: Annual Exam:  In general, how would you rate your overall physical health?: good  Frequency of exercise:: 4-5 days/week  Do you usually eat at least 4 servings of fruit and vegetables a day, include whole grains & fiber, and avoid regularly eating high fat or \"junk\" foods? : Yes  Taking medications regularly:: Yes  Medication side effects:: None  Activities of Daily Living: no assistance needed  Home safety: no safety concerns identified  Hearing Impairment:: difficulty following a conversation in a noisy restaurant or crowded room  In the past 6 months, have you been bothered by leaking of urine?: No  abdominal pain: No  Blood in stool: No  Blood in urine: No  chest pain: No  chills: No  congestion: No  constipation: No  cough: No  diarrhea: No  dizziness: No  ear pain: No  eye pain: No  nervous/anxious: Yes  fever: No  frequency: No  genital sores: No  headaches: No  hearing loss: No  heartburn: Yes  arthralgias: No  joint swelling: No  peripheral edema: No  mood changes: No  myalgias: No  nausea: No  dysuria: No  palpitations: No  Skin sensation changes: No  sore throat: No  urgency: Yes  rash: No  shortness of breath: No  visual disturbance: No  weakness: No  impotence: No  penile discharge: No  In general, how would you rate your overall mental or emotional health?: good  Additional concerns today:: No  Exercise outside of work (Submitted on 12/6/2023)  Chief Complaint: Annual Exam:  Duration of exercise:: 15-30 minutes    "

## 2024-02-21 DIAGNOSIS — E78.5 HYPERLIPIDEMIA LDL GOAL <100: ICD-10-CM

## 2024-02-21 RX ORDER — ATORVASTATIN CALCIUM 20 MG/1
20 TABLET, FILM COATED ORAL DAILY
Qty: 90 TABLET | Refills: 2 | Status: SHIPPED | OUTPATIENT
Start: 2024-02-21

## 2024-02-21 NOTE — TELEPHONE ENCOUNTER
Atorvastatin (Lipitor) 20 mg    Last Office Visit: 12/13/23  Future [facility] Appointments: 12/17/24  Medication last refilled: 2/21/23 #90 with 3 refill(s)    Required labs per protocol:    LAB REF RANGE 12/12/22 12/13/23   LDL < 100 mg/dL 83 69     Prescription approved per [facility] Refill Protocol.    MEEK CooleyN, RN, CCM

## 2024-03-04 ENCOUNTER — PRE VISIT (OUTPATIENT)
Dept: UROLOGY | Facility: CLINIC | Age: 69
End: 2024-03-04
Payer: COMMERCIAL

## 2024-03-04 NOTE — TELEPHONE ENCOUNTER
Reason for visit: follow up      Dx/Hx/Sx: elevated PSA     Records/imaging/labs/orders: PSA scheduled for 3/18/24    At Rooming: standard

## 2024-03-11 DIAGNOSIS — G47.00 INSOMNIA, UNSPECIFIED TYPE: ICD-10-CM

## 2024-03-12 RX ORDER — TRAZODONE HYDROCHLORIDE 50 MG/1
50 TABLET, FILM COATED ORAL AT BEDTIME
Qty: 90 TABLET | Refills: 2 | Status: SHIPPED | OUTPATIENT
Start: 2024-03-12

## 2024-03-12 NOTE — TELEPHONE ENCOUNTER
Medication requested: traZODone (DESYREL) 50 MG tablet   Last office visit: 12/13/23  [facility] appointments: 12/17/24  Medication last refilled: 1/11/23; 90 + 3 refills  Last qualifying labs: N/A    Prescription approved per [facility] Refill Protocol.    Chris LOZANO, RN  03/12/24 9:28 AM

## 2024-03-18 ENCOUNTER — LAB (OUTPATIENT)
Dept: LAB | Facility: CLINIC | Age: 69
End: 2024-03-18
Payer: COMMERCIAL

## 2024-03-18 DIAGNOSIS — R97.20 ELEVATED PROSTATE SPECIFIC ANTIGEN (PSA): ICD-10-CM

## 2024-03-18 LAB — PSA SERPL DL<=0.01 NG/ML-MCNC: 5.89 NG/ML (ref 0–4.5)

## 2024-03-18 PROCEDURE — 84153 ASSAY OF PSA TOTAL: CPT | Performed by: PATHOLOGY

## 2024-03-18 PROCEDURE — 36415 COLL VENOUS BLD VENIPUNCTURE: CPT | Performed by: PATHOLOGY

## 2024-03-19 ENCOUNTER — OFFICE VISIT (OUTPATIENT)
Dept: UROLOGY | Facility: CLINIC | Age: 69
End: 2024-03-19
Payer: COMMERCIAL

## 2024-03-19 VITALS
BODY MASS INDEX: 24.55 KG/M2 | WEIGHT: 162 LBS | SYSTOLIC BLOOD PRESSURE: 123 MMHG | HEART RATE: 53 BPM | HEIGHT: 68 IN | DIASTOLIC BLOOD PRESSURE: 78 MMHG

## 2024-03-19 DIAGNOSIS — R97.20 ELEVATED PROSTATE SPECIFIC ANTIGEN (PSA): Primary | ICD-10-CM

## 2024-03-19 PROCEDURE — 99213 OFFICE O/P EST LOW 20 MIN: CPT | Performed by: UROLOGY

## 2024-03-19 ASSESSMENT — PAIN SCALES - GENERAL: PAINLEVEL: NO PAIN (0)

## 2024-03-19 NOTE — NURSING NOTE
"Chief Complaint   Patient presents with    Follow Up       Blood pressure 123/78, pulse 53, height 1.727 m (5' 8\"), weight 73.5 kg (162 lb). Body mass index is 24.63 kg/m .    Patient Active Problem List   Diagnosis    Hyperlipidemia LDL goal <100    Esophageal reflux    Post-nasal drainage    Ganglion cyst of left foot    Fear of public speaking    Adjustment insomnia    Medicare annual wellness visit, subsequent    Elevated prostate specific antigen (PSA)    Chronic right-sided low back pain with right-sided sciatica    Performance anxiety    Strain of right calf muscle       No Known Allergies    Current Outpatient Medications   Medication Sig Dispense Refill    atorvastatin (LIPITOR) 20 MG tablet Take 1 tablet (20 mg) by mouth daily 90 tablet 2    Calcium Carbonate (CALCIUM 600 PO) Take 600 Units by mouth 2 times daily      famotidine (PEPCID) 20 MG tablet Take 20 mg by mouth 2 times daily      propranolol (INDERAL) 10 MG tablet 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

## 2024-03-19 NOTE — PROGRESS NOTES
CHIEF COMPLAINT   It was my pleasure to see Donald Torres who is a 68 year old male for follow-up of Elevated PSA.      HPI  Donald Torres is a very pleasant 68 year old male who presents with a history of Elevated PSA (Prostate Specific Antigen).       He was originally referred by Dr. Wilkinson who has been following his PSA since it has been first elevated to just over 4ng/ml in 2019. MRI was obtained at that time as well and did not demonstrate a clinically significant lesion. However, his PSA has continued to rise since with a more rapid increase up to ~7-8ng/ml at last check. Several repeat MRIs were performed which again demonstrated no clinically significant lesion. A TRUS biopsy was performed in 9/2022 that was benign. He presents for follow-up on his PSA which is 7.86 from 7.14 6 months ago.      Continues to endorses mild hesitancy, weak stream, nocturia (once nightly). No irritatitive symptoms or symptoms of UTI. No hematuria.     Family history: No family history of prostate cancer. Father had history of upper tract cancer. Non-smoker.     PSA  3/18/2024 - 5.89  3/20/23: 7.86  8/01/22: 7.14  4/26/22: 8.56  11/16/21: 6.18  9/17/20: 4.97  10/22/19: 4.85  7/15/19: 4.27  7/13/17: 3.52     TRUS Biopsy 9/22/2022  Final Diagnosis   A: Prostate, left base, needle core biopsy:  - Negative for malignancy.     B: Prostate, left mid, needle core biopsy:  - Negative for malignancy.     C: Prostate, left apex, needle core biopsy:  - Negative for malignancy.     D: Prostate, right base, needle core biopsy:  - Negative for malignancy.  - Acute and chronic inflammation present.     E: Prostate, right mid, needle core biopsy:  - Negative for malignancy.     F: Prostate, right apex, needle core biopsy:  - Negative for malignancy.  - Acute and chronic inflammation present.           MRI Prostate 5/26/2022  Size: 52 grams  IMPRESSION:  1. Based on the most suspicious abnormality, this exam is  characterized as PIRADS 2  [FreeTextEntry1] : repeat a1c in june - fax script to [place]\par start Pt\par \par called and discussed with nurse at sunrise. "- Clinically significant cancer is unlikely  to be present.    2. No suspicious adenopathy or evidence of pelvic metastases.    PHYSICAL EXAM  Patient is a 68 year old  male   Vitals: Blood pressure 123/78, pulse 53, height 1.727 m (5' 8\"), weight 73.5 kg (162 lb).  General Appearance Adult: Body mass index is 24.63 kg/m .  Alert, no acute distress, oriented  Lungs: no respiratory distress, or pursed lip breathing  Abdomen: soft, nontender, no organomegaly or masses  Back: no CVAT  Neuro: Alert, oriented, speech and mentation normal  Psych: affect and mood normal    Outside and Past Medical records:  Review of the result(s) of each unique test - PSA     ASSESSMENT and PLAN  69 yo M with hx of elevated PSA. His PSA was elevated at ~7-8 so an MRI and prostate biopsy was obtained. His MRI demonstrated no suspicious lesions and his biopsy showed no evidence of malignancy. His repeat PSA today has declined.  He is otherwise asymptomatic. Given that his PSA is roughly the same over the last year and his negative biopsy will have him follow-up in a year with a PSA. He would like to continue to follow with urology so will see him back around that time.      - PSA and follow-up in 12 months with Santos Eric PA-C    15 minutes spent on the date of the encounter doing chart review, history and exam, documentation and further activities as noted above.    Augustine Delvalle MD  Urology  [facility]    "

## 2024-03-19 NOTE — Clinical Note
3/19/2024       RE: Donald Torres  [address] [place] [id number]     Dear Colleague,    Thank you for referring your patient, Donald Torres, to the [facility] [place] [facility]. Please see a copy of my visit note below.      CHIEF COMPLAINT   It was my pleasure to see Donald Torres who is a 68 year old male for follow-up of ***.      HPI  Donald Torres is a very pleasant 68 year old male who presents with a history of Elevated PSA (Prostate Specific Antigen).       He was originally referred by Dr. Wilkinson who has been following his PSA since it has been first elevated to just over 4ng/ml in 2019. MRI was obtained at that time as well and did not demonstrate a clinically significant lesion. However, his PSA has continued to rise since with a more rapid increase up to ~7-8ng/ml at last check. Several repeat MRIs were performed which again demonstrated no clinically significant lesion. A TRUS biopsy was performed in 9/2022 that was benign. He presents for follow-up on his PSA which is 7.86 from 7.14 6 months ago.      Continues to endorses mild hesitancy, weak stream, nocturia (once nightly). No irritatitive symptoms or symptoms of UTI. No hematuria.     Family history: No family history of prostate cancer. Father had history of upper tract cancer. Non-smoker.     PSA  3/18/2024 - 5.89  3/20/23: 7.86  8/01/22: 7.14  4/26/22: 8.56  11/16/21: 6.18  9/17/20: 4.97  10/22/19: 4.85  7/15/19: 4.27  7/13/17: 3.52     TRUS Biopsy 9/22/2022  Final Diagnosis   A: Prostate, left base, needle core biopsy:  - Negative for malignancy.     B: Prostate, left mid, needle core biopsy:  - Negative for malignancy.     C: Prostate, left apex, needle core biopsy:  - Negative for malignancy.     D: Prostate, right base, needle core biopsy:  - Negative for malignancy.  - Acute and chronic inflammation present.     E: Prostate, right mid,  "needle core biopsy:  - Negative for malignancy.     F: Prostate, right apex, needle core biopsy:  - Negative for malignancy.  - Acute and chronic inflammation present.           MRI Prostate 5/26/2022  Size: 52 grams  IMPRESSION:  1. Based on the most suspicious abnormality, this exam is  characterized as PIRADS 2 - Clinically significant cancer is unlikely  to be present.    2. No suspicious adenopathy or evidence of pelvic metastases.    PHYSICAL EXAM  Patient is a 68 year old  male   Vitals: Blood pressure 123/78, pulse 53, height 1.727 m (5' 8\"), weight 73.5 kg (162 lb).  General Appearance Adult: Body mass index is 24.63 kg/m .  Alert, no acute distress, oriented  Lungs: no respiratory distress, or pursed lip breathing  Abdomen: soft, nontender, no organomegaly or masses; ***  Back: *** CVAT  Neuro: Alert, oriented, speech and mentation normal  Psych: affect and mood normal  : {FARHANA EXAM MALE GENITAL:199813}    Outside and Past Medical records:  Review of the result(s) of each unique test - PSA     ASSESSMENT and PLAN  67 yo M with hx of elevated PSA. His PSA was elevated at ~7-8 so an MRI and prostate biopsy was obtained. His MRI demonstrated no suspicious lesions and his biopsy showed no evidence of malignancy. His repeat PSA today remains stable.  He is otherwise asymptomatic. Given that his PSA is roughly the same over the last year and his negative biopsy will have him follow-up in a year with a PSA. He would like to continue to follow with urology so will see him back around that time.      - PSA and follow-up in a year      *** minutes spent on the date of the encounter doing chart review, history and exam, documentation and further activities as noted above.    Augustine Delvalle MD  Urology  [facility]        Again, thank you for allowing me to participate in the care of your patient.      Sincerely,    Augustine Delvalle MD    "

## 2024-04-04 ENCOUNTER — TELEPHONE (OUTPATIENT)
Dept: GASTROENTEROLOGY | Facility: CLINIC | Age: 69
End: 2024-04-04
Payer: COMMERCIAL

## 2024-04-04 DIAGNOSIS — D12.6 ADENOMATOUS POLYP OF COLON, UNSPECIFIED PART OF COLON: Primary | ICD-10-CM

## 2024-04-04 NOTE — TELEPHONE ENCOUNTER
The patient requested to schedule a screening colonoscopy. In 2020 he was instructed by Dr Chuy Morales to follow up in 3 years.     Currently no order in Epic.    Staff message to Dr Calderon Wilkinson requesting an order. The  will follow up with the patient.

## 2024-04-05 ENCOUNTER — TELEPHONE (OUTPATIENT)
Dept: GASTROENTEROLOGY | Facility: CLINIC | Age: 69
End: 2024-04-05
Payer: COMMERCIAL

## 2024-04-05 ENCOUNTER — PATIENT OUTREACH (OUTPATIENT)
Dept: GASTROENTEROLOGY | Facility: CLINIC | Age: 69
End: 2024-04-05
Payer: COMMERCIAL

## 2024-04-05 NOTE — TELEPHONE ENCOUNTER
"Endoscopy Scheduling Screen    Have you had a positive Covid test in the last 14 days?  No      What is your communication preference for Instructions and/or Bowel Prep?   Vello Apphart  email    What insurance is in the chart?  Other:  [facility]    Ordering/Referring Provider: EULOGIO FINLEY   (If ordering provider performs procedure, schedule with ordering provider unless otherwise instructed. )    BMI: Estimated body mass index is 24.63 kg/m  as calculated from the following:    Height as of 3/19/24: 1.727 m (5' 8\").    Weight as of 3/19/24: 73.5 kg (162 lb).     Sedation Ordered  moderate sedation.   If patient BMI > 50 do not schedule in [facility].    If patient BMI > 45 do not schedule at [facility].    Are you taking methadone or Suboxone?  No    Have you had difficulties, pain, or discomfort during past endoscopy procedures?  No    Are you taking any prescription medications for pain 3 or more times per week?   NO, No RN review required.      Do you have a history of malignant hyperthermia?  No      (Females) Are you currently pregnant?          Have you been diagnosed or told you have pulmonary hypertension?   No      Do you have an LVAD?  No      Have you been told you have moderate to severe sleep apnea?  No    Have you been told you have COPD, asthma, or any other lung disease?  No      Do you have any heart conditions?  No       Have you ever had or are you waiting for an organ transplant?  No. Continue scheduling, no site restrictions.      Have you had a stroke or transient ischemic attack (TIA aka \"mini stroke\" in the last 6 months?   No      Have you been diagnosed with or been told you have cirrhosis of the liver?   No      Are you currently on dialysis?   No      Do you need assistance transferring?   No    BMI: Estimated body mass index is 24.63 kg/m  as calculated from the following:    Height as of 3/19/24: 1.727 m (5' 8\").    Weight as of 3/19/24: 73.5 kg (162 lb).     Is patients BMI > 40 and scheduling " location [facility]?  No      Do you take an injectable medication for weight loss or diabetes (excluding insulin)?  No    Do you take the medication Naltrexone?  No    Do you take blood thinners?  No       Prep   Are you currently on dialysis or do you have chronic kidney disease?  No    Do you have a diagnosis of diabetes?  No    Do you have a diagnosis of cystic fibrosis (CF)?  No    On a regular basis do you go 3 -5 days between bowel movements?  No    BMI > 40?  No    Preferred Pharmacy:    [id number] - [place], [place] - [address] AT [facility] [address]  [address]  [place] [address]  Phone: [phone] Fax: [phone]      Final Scheduling Details     Procedure scheduled  Colonoscopy    Surgeon:  FAVIO     Date of procedure:  8/27/24     Pre-OP / PAC:   No - Not required for this site.    Location  [facility] - [facility] - Patient preference.    Sedation   Moderate Sedation - Per order.      Patient Reminders:   You will receive a call from a Nurse to review instructions and health history.  This assessment must be completed prior to your procedure.  Failure to complete the Nurse assessment may result in the procedure being cancelled.      On the day of your procedure, please designate an adult(s) who can drive you home stay with you for the next 24 hours. The medicines used in the exam will make you sleepy. You will not be able to drive.      You cannot take public transportation, ride share services, or non-medical taxi service without a responsible caregiver.  Medical transport services are allowed with the requirement that a responsible caregiver will receive you at your destination.  We require that drivers and caregivers are confirmed prior to your procedure.

## 2024-08-20 ENCOUNTER — TELEPHONE (OUTPATIENT)
Dept: GASTROENTEROLOGY | Facility: CLINIC | Age: 69
End: 2024-08-20

## 2024-08-20 NOTE — TELEPHONE ENCOUNTER
Pre assessment completed for upcoming procedure.   (Please see previous telephone encounter notes for complete details)    Patient  returned call.       Procedure details:    Arrival time and facility location reviewed.    Pre op exam needed? No.    Designated  policy reviewed. Instructed to have someone stay 6  hours post procedure.       Medication review:    Medications reviewed. Please see supporting documentation below. Holding recommendations discussed (if applicable).   N/A      Prep for procedure:     Procedure prep instructions reviewed.        Any additional information needed:  N/A      Patient  verbalized understanding and had no questions or concerns at this time.      Melba Mazariegos RN  Endoscopy Procedure Pre Assessment   [phone] option 4

## 2024-08-20 NOTE — TELEPHONE ENCOUNTER
Attempted to contact patient in order to complete pre assessment questions.     Patient scheduled for Colonoscopy on 8/27/24.     No answer. Left message to return call to [phone] option 4    Callback required communication sent via [employer].    Erika Hinton RN  Endoscopy Procedure Pre Assessment

## 2024-08-20 NOTE — TELEPHONE ENCOUNTER
Pre visit planning completed.      Procedure details:    Patient scheduled for Colonoscopy on 8/27/24.     Arrival time: 0745. Procedure time 0845    Facility location: [facility]; [address] [place] [address], [address] Floor, [place] [address]. Check in location: 5th Floor.    Sedation type: Conscious sedation     Pre op exam needed? No.    Indication for procedure: Adenomatous polyp of colon, unspecified part of colon        Chart review:     Electronic implanted devices? No    Recent diagnosis of diverticulitis within the last 6 weeks? No      Medication review:    Diabetic? No    Anticoagulants? No    Weight loss medication/injectable? No GLP-1 medication per patient's medication list.  RN will verify with pre-assessment call.    NSAIDS? No    Other medication HOLDING recommendations:  N/A      Prep for procedure:     Bowel prep recommendation: Standard Miralax  Due to: standard bowel prep.    Prep instructions sent via [employer]         Melba Chauhan RN  Endoscopy Procedure Pre Assessment RN  [phone] option 4

## 2024-08-26 DIAGNOSIS — E78.5 HYPERLIPIDEMIA LDL GOAL <100: ICD-10-CM

## 2024-08-27 ENCOUNTER — HOSPITAL ENCOUNTER (OUTPATIENT)
Facility: AMBULATORY SURGERY CENTER | Age: 69
Discharge: HOME | End: 2024-08-27
Attending: INTERNAL MEDICINE | Admitting: INTERNAL MEDICINE
Payer: COMMERCIAL

## 2024-08-27 VITALS
DIASTOLIC BLOOD PRESSURE: 60 MMHG | BODY MASS INDEX: 24.88 KG/M2 | RESPIRATION RATE: 16 BRPM | TEMPERATURE: 97 F | OXYGEN SATURATION: 97 % | HEART RATE: 55 BPM | WEIGHT: 168 LBS | SYSTOLIC BLOOD PRESSURE: 108 MMHG | HEIGHT: 69 IN

## 2024-08-27 LAB — COLONOSCOPY: NORMAL

## 2024-08-27 PROCEDURE — 45380 COLONOSCOPY AND BIOPSY: CPT | Mod: 59,PT | Performed by: INTERNAL MEDICINE

## 2024-08-27 PROCEDURE — 88305 TISSUE EXAM BY PATHOLOGIST: CPT | Mod: 26 | Performed by: PATHOLOGY

## 2024-08-27 PROCEDURE — 45385 COLONOSCOPY W/LESION REMOVAL: CPT | Mod: PT | Performed by: INTERNAL MEDICINE

## 2024-08-27 PROCEDURE — 88305 TISSUE EXAM BY PATHOLOGIST: CPT | Mod: TC | Performed by: INTERNAL MEDICINE

## 2024-08-27 RX ORDER — ONDANSETRON 2 MG/ML
4 INJECTION INTRAMUSCULAR; INTRAVENOUS
Status: DISCONTINUED | OUTPATIENT
Start: 2024-08-27 | End: 2024-08-27 | Stop reason: HOSPADM

## 2024-08-27 RX ORDER — LIDOCAINE 40 MG/G
CREAM TOPICAL
Status: DISCONTINUED | OUTPATIENT
Start: 2024-08-27 | End: 2024-08-27 | Stop reason: HOSPADM

## 2024-08-27 RX ORDER — NALOXONE HYDROCHLORIDE 0.4 MG/ML
0.4 INJECTION, SOLUTION INTRAMUSCULAR; INTRAVENOUS; SUBCUTANEOUS
Status: DISCONTINUED | OUTPATIENT
Start: 2024-08-27 | End: 2024-08-28 | Stop reason: HOSPADM

## 2024-08-27 RX ORDER — ATORVASTATIN CALCIUM 20 MG/1
20 TABLET, FILM COATED ORAL DAILY
Qty: 90 TABLET | Refills: 2 | OUTPATIENT
Start: 2024-08-27

## 2024-08-27 RX ORDER — NALOXONE HYDROCHLORIDE 0.4 MG/ML
0.2 INJECTION, SOLUTION INTRAMUSCULAR; INTRAVENOUS; SUBCUTANEOUS
Status: DISCONTINUED | OUTPATIENT
Start: 2024-08-27 | End: 2024-08-28 | Stop reason: HOSPADM

## 2024-08-27 RX ORDER — PROCHLORPERAZINE MALEATE 5 MG
5 TABLET ORAL EVERY 6 HOURS PRN
Status: DISCONTINUED | OUTPATIENT
Start: 2024-08-27 | End: 2024-08-28 | Stop reason: HOSPADM

## 2024-08-27 RX ORDER — FLUMAZENIL 0.1 MG/ML
0.2 INJECTION, SOLUTION INTRAVENOUS
Status: ACTIVE | OUTPATIENT
Start: 2024-08-27 | End: 2024-08-27

## 2024-08-27 RX ORDER — ONDANSETRON 2 MG/ML
4 INJECTION INTRAMUSCULAR; INTRAVENOUS EVERY 6 HOURS PRN
Status: DISCONTINUED | OUTPATIENT
Start: 2024-08-27 | End: 2024-08-28 | Stop reason: HOSPADM

## 2024-08-27 RX ORDER — SODIUM CHLORIDE, SODIUM LACTATE, POTASSIUM CHLORIDE, CALCIUM CHLORIDE 600; 310; 30; 20 MG/100ML; MG/100ML; MG/100ML; MG/100ML
INJECTION, SOLUTION INTRAVENOUS CONTINUOUS
Status: DISCONTINUED | OUTPATIENT
Start: 2024-08-27 | End: 2024-08-28 | Stop reason: HOSPADM

## 2024-08-27 RX ORDER — FENTANYL CITRATE 50 UG/ML
INJECTION, SOLUTION INTRAMUSCULAR; INTRAVENOUS DAILY PRN
Status: DISCONTINUED | OUTPATIENT
Start: 2024-08-27 | End: 2024-08-27 | Stop reason: HOSPADM

## 2024-08-27 RX ORDER — ONDANSETRON 4 MG/1
4 TABLET, ORALLY DISINTEGRATING ORAL EVERY 6 HOURS PRN
Status: DISCONTINUED | OUTPATIENT
Start: 2024-08-27 | End: 2024-08-28 | Stop reason: HOSPADM
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
## 2024-11-25 DIAGNOSIS — E78.5 HYPERLIPIDEMIA LDL GOAL <100: ICD-10-CM

## 2024-11-25 RX ORDER — ATORVASTATIN CALCIUM 20 MG/1
20 TABLET, FILM COATED ORAL DAILY
Qty: 90 TABLET | Refills: 0 | Status: SHIPPED | OUTPATIENT
Start: 2024-11-25

## 2024-11-25 NOTE — TELEPHONE ENCOUNTER
Medication requested: atorvastatin (LIPITOR) 20 MG tablet   Last office visit: 12/13/23  [facility] appointments: 12/16/24  Medication last refilled: 2/21/24; 90 + 2 refill  Last qualifying labs:   Component      Latest Ref Rng 12/13/2023  1:47 PM   Cholesterol      <200 mg/dL 144    Triglycerides      <150 mg/dL 132    HDL Cholesterol      >=40 mg/dL 49    LDL Cholesterol Calculated      <=100 mg/dL 69    Non HDL Cholesterol      <130 mg/dL 95    Patient Fasting? No      Prescription approved per [facility] Refill Protocol.    Chris LOZANO, RN  11/25/24 12:27 PM

## 2024-12-09 NOTE — NURSING NOTE
64 year old  Chief Complaint   Patient presents with     Skin Tags     middle of back onleft side, started to itch last night and thats when patient noticed it.        Blood pressure 127/70, pulse 83, temperature 97.7  F (36.5  C), temperature source Oral, resp. rate 16, weight 73.7 kg (162 lb 8 oz), SpO2 98 %. Body mass index is 24.35 kg/m .  Patient Active Problem List   Diagnosis     Hyperlipidemia LDL goal <100     Esophageal reflux     Post-nasal drainage     Ganglion cyst of left foot     Fear of public speaking     Adjustment insomnia       Wt Readings from Last 2 Encounters:   12/17/19 73.7 kg (162 lb 8 oz)   07/15/19 76.9 kg (169 lb 8 oz)     BP Readings from Last 3 Encounters:   12/17/19 127/70   07/15/19 119/75   05/06/19 125/77         Current Outpatient Medications   Medication     atorvastatin (LIPITOR) 20 MG tablet     Fexofenadine HCl (MUCINEX ALLERGY PO)     propranolol (INDERAL) 10 MG tablet     traZODone (DESYREL) 50 MG tablet     ASPIRIN PO     Calcium Carbonate (CALCIUM 600 PO)     doxylamine (UNISOM) 25 MG TABS tablet     Glucosamine HCl (GLUCOSAMINE PO)     MELATONIN PO     Ranitidine HCl (ZANTAC PO)     terbinafine (LAMISIL) 1 % external cream     No current facility-administered medications for this visit.        Social History     Tobacco Use     Smoking status: Never Smoker     Smokeless tobacco: Never Used   Substance Use Topics     Alcohol use: Yes     Comment: wine couple times per week     Drug use: No       Health Maintenance Due   Topic Date Due     ADVANCE CARE PLANNING  1955     HIV SCREENING  04/29/1969     ZOSTER IMMUNIZATION (1 of 2) 04/29/2004     FLEX SIG  01/25/2015     AORTIC ANEURYSM SCREENING (SYSTEM ASSIGNED)  04/29/2019       No results found for: PAP      December 17, 2019 2:05 PM     You can access the FollowMyHealth Patient Portal offered by [facility] by registering at the following website: [email or web address]. By joining [employer]’s FollowMyHealth portal, you will also be able to view your health information using other applications (apps) compatible with our system.

## 2024-12-11 SDOH — HEALTH STABILITY: PHYSICAL HEALTH: ON AVERAGE, HOW MANY MINUTES DO YOU ENGAGE IN EXERCISE AT THIS LEVEL?: 30 MIN

## 2024-12-11 SDOH — HEALTH STABILITY: PHYSICAL HEALTH: ON AVERAGE, HOW MANY DAYS PER WEEK DO YOU ENGAGE IN MODERATE TO STRENUOUS EXERCISE (LIKE A BRISK WALK)?: 3 DAYS

## 2024-12-11 ASSESSMENT — SOCIAL DETERMINANTS OF HEALTH (SDOH): HOW OFTEN DO YOU GET TOGETHER WITH FRIENDS OR RELATIVES?: THREE TIMES A WEEK

## 2024-12-16 ENCOUNTER — OFFICE VISIT (OUTPATIENT)
Dept: FAMILY MEDICINE | Facility: CLINIC | Age: 69
End: 2024-12-16
Payer: COMMERCIAL

## 2024-12-16 VITALS
WEIGHT: 159 LBS | DIASTOLIC BLOOD PRESSURE: 94 MMHG | RESPIRATION RATE: 14 BRPM | HEIGHT: 69 IN | OXYGEN SATURATION: 99 % | BODY MASS INDEX: 23.55 KG/M2 | HEART RATE: 56 BPM | TEMPERATURE: 98.1 F | SYSTOLIC BLOOD PRESSURE: 164 MMHG

## 2024-12-16 DIAGNOSIS — E78.5 HYPERLIPIDEMIA LDL GOAL <100: ICD-10-CM

## 2024-12-16 DIAGNOSIS — Z00.00 MEDICARE ANNUAL WELLNESS VISIT, SUBSEQUENT: Primary | ICD-10-CM

## 2024-12-16 DIAGNOSIS — R41.89 COGNITIVE CHANGE: ICD-10-CM

## 2024-12-16 DIAGNOSIS — Z12.5 SCREENING FOR PROSTATE CANCER: ICD-10-CM

## 2024-12-16 DIAGNOSIS — H90.3 BILATERAL SENSORINEURAL HEARING LOSS: ICD-10-CM

## 2024-12-16 DIAGNOSIS — G47.00 INSOMNIA, UNSPECIFIED TYPE: ICD-10-CM

## 2024-12-16 DIAGNOSIS — Z13.1 SCREENING FOR DIABETES MELLITUS: ICD-10-CM

## 2024-12-16 LAB
ALT SERPL W P-5'-P-CCNC: 36 U/L (ref 0–70)
ANION GAP SERPL CALCULATED.3IONS-SCNC: 9 MMOL/L (ref 7–15)
BUN SERPL-MCNC: 18.3 MG/DL (ref 8–23)
CALCIUM SERPL-MCNC: 10 MG/DL (ref 8.8–10.4)
CHLORIDE SERPL-SCNC: 101 MMOL/L (ref 98–107)
CHOLEST SERPL-MCNC: 184 MG/DL
CREAT SERPL-MCNC: 0.88 MG/DL (ref 0.67–1.17)
EGFRCR SERPLBLD CKD-EPI 2021: >90 ML/MIN/1.73M2
FASTING STATUS PATIENT QL REPORTED: NO
FASTING STATUS PATIENT QL REPORTED: NO
GLUCOSE SERPL-MCNC: 97 MG/DL (ref 70–99)
HCO3 SERPL-SCNC: 30 MMOL/L (ref 22–29)
HDLC SERPL-MCNC: 57 MG/DL
LDLC SERPL CALC-MCNC: 86 MG/DL
NONHDLC SERPL-MCNC: 127 MG/DL
POTASSIUM SERPL-SCNC: 4.2 MMOL/L (ref 3.4–5.3)
PSA SERPL DL<=0.01 NG/ML-MCNC: 6.6 NG/ML (ref 0–6.5)
SODIUM SERPL-SCNC: 140 MMOL/L (ref 135–145)
TRIGL SERPL-MCNC: 203 MG/DL
VIT B12 SERPL-MCNC: 671 PG/ML (ref 232–1245)

## 2024-12-16 RX ORDER — ATORVASTATIN CALCIUM 20 MG/1
20 TABLET, FILM COATED ORAL DAILY
Qty: 90 TABLET | Refills: 4 | Status: SHIPPED | OUTPATIENT
Start: 2024-12-16

## 2024-12-16 RX ORDER — TRAZODONE HYDROCHLORIDE 50 MG/1
50 TABLET, FILM COATED ORAL AT BEDTIME
Qty: 90 TABLET | Refills: 4 | Status: SHIPPED | OUTPATIENT
Start: 2024-12-16

## 2024-12-16 ASSESSMENT — ANXIETY QUESTIONNAIRES
7. FEELING AFRAID AS IF SOMETHING AWFUL MIGHT HAPPEN: SEVERAL DAYS
3. WORRYING TOO MUCH ABOUT DIFFERENT THINGS: SEVERAL DAYS
GAD7 TOTAL SCORE: 6
2. NOT BEING ABLE TO STOP OR CONTROL WORRYING: SEVERAL DAYS
5. BEING SO RESTLESS THAT IT IS HARD TO SIT STILL: NOT AT ALL
1. FEELING NERVOUS, ANXIOUS, OR ON EDGE: SEVERAL DAYS
IF YOU CHECKED OFF ANY PROBLEMS ON THIS QUESTIONNAIRE, HOW DIFFICULT HAVE THESE PROBLEMS MADE IT FOR YOU TO DO YOUR WORK, TAKE CARE OF THINGS AT HOME, OR GET ALONG WITH OTHER PEOPLE: SOMEWHAT DIFFICULT
GAD7 TOTAL SCORE: 6
6. BECOMING EASILY ANNOYED OR IRRITABLE: SEVERAL DAYS

## 2024-12-16 ASSESSMENT — PATIENT HEALTH QUESTIONNAIRE - PHQ9
5. POOR APPETITE OR OVEREATING: SEVERAL DAYS
SUM OF ALL RESPONSES TO PHQ QUESTIONS 1-9: 2

## 2024-12-16 NOTE — PROGRESS NOTES
"Preventive Care Visit  [facility]  Calderon Wilkinson MD, Family Medicine  Dec 16, 2024    Subjective   Josep is a 69 year old, presenting for the following:  Wellness Visit    HPI    Josep is a 69-year-old, accompanied by his wife, here today for his Medicare annual wellness visit, subsequent.  For the most part, he is doing quite well.    He has a slight \"catch\" in his neck as he turns it.  It is really not happening now but was more of a problem in the recent past.  It would never result in any numbness.  Given that, we likely will not pursue anything at this time.  Reassurance was given.    He feels that his voice can be hoarse at times.  He knows that he has a history of reflux.  He produces quite a bit this phlegm and has some postnasal drip.  He uses famotidine on a regular basis.  We discussed the possibility of referring him to ENT at some point but we will hold off on that for now.    The biggest concern is that he has some memory concerns.  He does not have an issue with the short-term memory.  He states \"I am not as sharp as I used to be.\"  He has noticed that if he drives a different or new route, that is certainly harder than it used to be.  Sometimes, he can lose track of where he is at in conversation.  It seems to be as intermediate memory that the biggest issue.  Today, for example, he was slightly confused on the way in. both his wife (who came in today largely because of this) and his kids are noticing some changes.  Of note, he can play a game like chest quite well in terms of thinking ahead for next steps and moves it.    There are times when he can get anxious and that seems to play into this a bit.    The 3 of had a very good conversation about all of this.    Josep does not have any care gaps at this time in terms of healthcare maintenance strategies.    He might have some hearing loss and we talked about getting an audiology exam as this would be very helpful in the context of memory " concerns.  In addition, we will check a B12 level.          12/11/2024   General Health   How would you rate your overall physical health? Good   Feel stress (tense, anxious, or unable to sleep) To some extent      (!) STRESS CONCERN      12/11/2024   Nutrition   Diet: Regular (no restrictions)            12/11/2024   Exercise   Days per week of moderate/strenous exercise 3 days   Average minutes spent exercising at this level 30 min            12/11/2024   Social Factors   Frequency of gathering with friends or relatives Three times a week   Worry food won't last until get money to buy more No   Food not last or not have enough money for food? No   Do you have housing? (Housing is defined as stable permanent housing and does not include staying ouside in a car, in a tent, in an abandoned building, in an overnight shelter, or couch-surfing.) Yes   Are you worried about losing your housing? No   Lack of transportation? No   Unable to get utilities (heat,electricity)? No            12/11/2024   Fall Risk   Fallen 2 or more times in the past year? No    Trouble with walking or balance? No        Patient-reported          12/11/2024   Activities of Daily Living- Home Safety   Needs help with the following daily activites None of the above   Safety concerns in the home None of the above            12/11/2024   Dental   Dentist two times every year? Yes            12/11/2024   Hearing Screening   Hearing concerns? (!) IT'S HARD TO FOLLOW A CONVERSATION IN A NOISY RESTAURANT OR CROWDED ROOM.            12/11/2024   Driving Risk Screening   Patient/family members have concerns about driving No            12/11/2024   General Alertness/Fatigue Screening   Have you been more tired than usual lately? No            12/11/2024   Urinary Incontinence Screening   Bothered by leaking urine in past 6 months No            12/11/2024   TB Screening   Were you born outside of the [place]? No      Today's PHQ-2 Score:       12/15/2024     10:04 AM   PHQ-2 ( 1999 [employer])   Q1: Little interest or pleasure in doing things 0    Q2: Feeling down, depressed or hopeless 0    PHQ-2 Score 0    Q1: Little interest or pleasure in doing things Not at all   Q2: Feeling down, depressed or hopeless Not at all   PHQ-2 Score 0       Patient-reported           12/11/2024   Substance Use   Alcohol more than 3/day or more than 7/wk No   Do you have a current opioid prescription? No   How severe/bad is pain from 1 to 10? 0/10 (No Pain)   Do you use any other substances recreationally? No      Social History     Tobacco Use    Smoking status: Never    Smokeless tobacco: Never    Tobacco comments:     few cigarettes in high school (less than 10)   Substance Use Topics    Alcohol use: Yes     Alcohol/week: 2.0 standard drinks of alcohol     Types: 2 Standard drinks or equivalent per week    Drug use: No     Family History   Problem Relation Age of Onset    Asthma Mother     Depression Mother     Lipids Father     Hypertension Father     Parkinsonism Father     Cancer Father 85        of urethra    High cholesterol Sister     High cholesterol Sister     High cholesterol Brother     Arrhythmia Brother     High cholesterol Brother     Diabetes Paternal Grandmother     Colon Cancer No family hx of     Prostate Cancer No family hx of     Breast Cancer No family hx of     Ovarian Cancer No family hx of          12/11/2024   AAA Screening   Family history of Abdominal Aortic Aneurysm (AAA)? No      ASCVD Risk   The 10-year ASCVD risk score (Dhiraj RED, et al., 2019) is: 11.8%    Values used to calculate the score:      Age: 69 years      Sex: Male      Is Non- : No      Diabetic: No      Tobacco smoker: No      Systolic Blood Pressure: 108 mmHg      Is BP treated: No      HDL Cholesterol: 49 mg/dL      Total Cholesterol: 144 mg/dL    The following health maintenance items are reviewed in Epic and correct as of today:  Health Maintenance   Topic Date  "Due    ADVANCE CARE PLANNING  Never done    LIPID  12/13/2024    MEDICARE ANNUAL WELLNESS VISIT  12/13/2024    FALL RISK ASSESSMENT  12/16/2025    DTAP/TDAP/TD IMMUNIZATION (3 - Td or Tdap) 11/11/2026    GLUCOSE  12/13/2026    COLORECTAL CANCER SCREENING  08/27/2027    RSV VACCINE (1 - 1-dose 75+ series) 04/29/2029    HEPATITIS C SCREENING  Completed    PHQ-2 (once per calendar year)  Completed    INFLUENZA VACCINE  Completed    Pneumococcal Vaccine: 65+ Years  Completed    ZOSTER IMMUNIZATION  Completed    COVID-19 Vaccine  Completed    HPV IMMUNIZATION  Aged Out    MENINGITIS IMMUNIZATION  Aged Out    RSV MONOCLONAL ANTIBODY  Aged Out       Review of Systems  Constitutional, neuro, ENT, endocrine, pulmonary, cardiac, gastrointestinal, genitourinary, musculoskeletal, integument and psychiatric systems are negative, except as otherwise noted.     Objective    Exam  BP (!) 164/94   Pulse 56   Temp 98.1  F (36.7  C) (Skin)   Resp 14   Ht 1.74 m (5' 8.5\")   Wt 72.1 kg (159 lb)   SpO2 99%   BMI 23.82 kg/m       Estimated body mass index is 23.82 kg/m  as calculated from the following:    Height as of this encounter: 1.74 m (5' 8.5\").    Weight as of this encounter: 72.1 kg (159 lb).    Physical Exam  GENERAL: alert and no distress  EYES: Eyes grossly normal to inspection, PERRL and conjunctivae and sclerae normal  HENT: ear canals and TM's normal, nose and mouth without ulcers or lesions  NECK: no adenopathy, no asymmetry, masses, or scars  RESP: lungs clear to auscultation - no rales, rhonchi or wheezes  CV: regular rate and rhythm, normal S1 S2, no S3 or S4, no murmur, click or rub, no peripheral edema  MS: no gross musculoskeletal defects noted, no edema  SKIN: no suspicious lesions or rashes  NEURO: Normal strength and tone, mentation intact and speech normal  PSYCH: mentation appears normal, affect normal/bright    Laboratory studies: BMP, lipid panel, ALT, PSA, and B12, all " pending      Assessment/Plan:    Josep is a 69-year-old here today for his Medicare annual wellness visit, subsequent.  From a healthcare maintenance standpoint, he is completely up-to-date with everything as of today.    Intermediate memory loss and some cognitive concerns.  He will get his hearing checked.  B12 level pending.  I will see him back to administer a Mini-Mental status exam (M I am asked see) just to get a baseline.  Hopefully have his hearing testing done by then we can review those results.  If his B12 level is low, we will treat that of course.    Refill sent in for his trazodone as well as his atorvastatin.    For now, we will not do anything more with his neck or his voice.  Josep is perfectly fine with that.        Signed Electronically by: Calderon Wilkinson MD

## 2024-12-16 NOTE — PATIENT INSTRUCTIONS
For your voice, try taking OTC Mucinex (guifenesin) daily for 2-4 weeks.  If that doesn't help, let me know and I'll refer you to ENT

## 2024-12-16 NOTE — NURSING NOTE
"Josep  69 year old    Chief Complaint   Patient presents with    Wellness Visit            Blood pressure (!) 158/81, pulse 56, temperature 98.1  F (36.7  C), temperature source Skin, resp. rate 14, height 1.74 m (5' 8.5\"), weight 72.1 kg (159 lb), SpO2 99%. Body mass index is 23.82 kg/m .    Patient Active Problem List   Diagnosis    Hyperlipidemia LDL goal <100    Esophageal reflux    Post-nasal drainage    Ganglion cyst of left foot    Fear of public speaking    Adjustment insomnia    Medicare annual wellness visit, subsequent    Elevated prostate specific antigen (PSA)    Chronic right-sided low back pain with right-sided sciatica    Performance anxiety    Strain of right calf muscle              Wt Readings from Last 2 Encounters:   12/16/24 72.1 kg (159 lb)   08/27/24 76.2 kg (168 lb)       BP Readings from Last 3 Encounters:   12/16/24 (!) 158/81   08/27/24 108/60   03/19/24 123/78                Current Outpatient Medications   Medication Sig Dispense Refill    atorvastatin (LIPITOR) 20 MG tablet Take 1 tablet (20 mg) by mouth daily. 90 tablet 0    Calcium Carbonate (CALCIUM 600 PO) Take 600 Units by mouth 2 times daily      famotidine (PEPCID) 20 MG tablet Take 20 mg by mouth 2 times daily      traZODone (DESYREL) 50 MG tablet Take 1 tablet (50 mg) by mouth at bedtime 90 tablet 2    TURMERIC PO Take by mouth.       No current facility-administered medications for this visit.              Social History     Tobacco Use    Smoking status: Never    Smokeless tobacco: Never    Tobacco comments:     few cigarettes in high school (less than 10)   Substance Use Topics    Alcohol use: Yes     Alcohol/week: 2.0 standard drinks of alcohol     Types: 2 Standard drinks or equivalent per week    Drug use: No              Health Maintenance Due   Topic Date Due    ADVANCE CARE PLANNING  Never done    LIPID  12/13/2024    MEDICARE ANNUAL WELLNESS VISIT  12/13/2024            No results found for: \"PAP\"           December " 16, 2024 8:24 AM

## 2024-12-29 PROBLEM — R41.89 COGNITIVE CHANGE: Status: ACTIVE | Noted: 2024-12-29

## 2025-02-19 NOTE — PROGRESS NOTES
sAUDIOLOGY REPORT    SUBJECTIVE:  Donald Torres is a 69 year old male who was seen on 3/6/25 in the Audiology Clinic at the [facility] [place] for audiologic evaluation, referred by Calderon Wilkinson M.D.     The patient notes increasing difficulty hearing in restaurants and hearing his wife when she isn't facing him.   He denies tinnitus, dizziness, ear pain, drainage from ears, aural fullness, or history of ear surgery.  He has a significant history of noise exposure.  He was a right handed trap shooter in childhood, and then in adulthood, he used jackhammers and tractors, all without hearing protection.  He now uses a table saw and uses hearing protection consistently.     OBJECTIVE:  Abuse Screening:  Do you feel unsafe at home or work/school? No  Do you feel threatened by someone? No  Does anyone try to keep you from having contact with others, or doing things outside of your home? No  Physical signs of abuse present? No     Fall Risk Screen:  1. Have you fallen two or more times in the past year? No  2. Have you fallen and had an injury in the past year? No    Timed Up and Go Score (in seconds): not tested  Is patient a fall risk? No  Referral initiated: No  Fall Risk Assessment Completed by Audiology    Otoscopic exam indicates ears are clear of cerumen bilaterally.      Pure Tone Thresholds assessed using conventional audiometry with good reliability from 250-8000 Hz bilaterally using insert earphones and circumaural headphones     RIGHT:  normal/borderline normal hearing through 4000 Hz, sloping to a mild/moderate presumed sensorineural hearing loss at 5594-4967 Hz    LEFT:  normal/borderline normal hearing.    NOTE: Right ear is 25-35 dB poorer than left ear at 4378-9322 Hz.  Bela negative     Tympanogram:    RIGHT: normal eardrum mobility    LEFT:   normal eardrum mobility    Reflexes (reported by stimulus ear):  RIGHT: Ipsilateral is present at normal  levels  RIGHT: Contralateral is present at normal levels  LEFT:   Ipsilateral is present at normal levels  LEFT:   Contralateral is present at normal levels    Speech Reception Threshold:    RIGHT: 15 dB HL    LEFT:   10 dB HL  Word Recognition Score:     RIGHT: 96% at 55 dB HL using NU-6 recorded word list.    LEFT:   92% at 50 dB HL using NU-6 recorded word list.      ASSESSMENT:   Sensorineural hearing loss at 2641-7333 Hz in right ear.    Normal/borderline normal hearing in left ear.      Today s results were discussed with the patient in detail. Discussed that a right hearing aid is not expected to provide notable benefit given that the hearing loss is only at 1474-0095 Hz.     PLAN:    ENT consult due to asymmetrical hearing loss.    Retest at intervals advised by ENT.    Provided handout on communication strategies.    Continued consistent use of hearing protection.   Follow up with PCP     The patient expressed understanding and agreement with this plan.      Holli Jacobsen, CCC-A, [facility]  Licensed Audiologist  [place] #[id number]     Calderon Wilkinson M.D.

## 2025-03-03 ENCOUNTER — LAB (OUTPATIENT)
Dept: LAB | Facility: CLINIC | Age: 70
End: 2025-03-03
Payer: COMMERCIAL

## 2025-03-03 DIAGNOSIS — R97.20 ELEVATED PROSTATE SPECIFIC ANTIGEN (PSA): ICD-10-CM

## 2025-03-03 LAB — PSA SERPL DL<=0.01 NG/ML-MCNC: 5.95 NG/ML (ref 0–6.5)

## 2025-03-03 PROCEDURE — 36415 COLL VENOUS BLD VENIPUNCTURE: CPT | Performed by: PATHOLOGY

## 2025-03-03 PROCEDURE — 84153 ASSAY OF PSA TOTAL: CPT | Performed by: PATHOLOGY

## 2025-03-06 ENCOUNTER — OFFICE VISIT (OUTPATIENT)
Dept: AUDIOLOGY | Facility: CLINIC | Age: 70
End: 2025-03-06
Attending: FAMILY MEDICINE
Payer: COMMERCIAL

## 2025-03-06 DIAGNOSIS — H90.41 SENSORINEURAL HEARING LOSS (SNHL) OF RIGHT EAR WITH UNRESTRICTED HEARING OF LEFT EAR: Primary | ICD-10-CM

## 2025-03-06 DIAGNOSIS — H90.3 BILATERAL SENSORINEURAL HEARING LOSS: ICD-10-CM

## 2025-03-12 ENCOUNTER — PRE VISIT (OUTPATIENT)
Dept: UROLOGY | Facility: CLINIC | Age: 70
End: 2025-03-12
Payer: COMMERCIAL

## 2025-03-12 ENCOUNTER — OFFICE VISIT (OUTPATIENT)
Dept: FAMILY MEDICINE | Facility: CLINIC | Age: 70
End: 2025-03-12
Payer: COMMERCIAL

## 2025-03-12 VITALS
HEART RATE: 60 BPM | OXYGEN SATURATION: 97 % | DIASTOLIC BLOOD PRESSURE: 62 MMHG | RESPIRATION RATE: 14 BRPM | WEIGHT: 161 LBS | BODY MASS INDEX: 24.12 KG/M2 | SYSTOLIC BLOOD PRESSURE: 123 MMHG | TEMPERATURE: 98.1 F

## 2025-03-12 DIAGNOSIS — F41.1 GAD (GENERALIZED ANXIETY DISORDER): ICD-10-CM

## 2025-03-12 DIAGNOSIS — H91.8X3 ASYMMETRICAL HEARING LOSS: Primary | ICD-10-CM

## 2025-03-12 RX ORDER — SERTRALINE HYDROCHLORIDE 25 MG/1
25 TABLET, FILM COATED ORAL DAILY
Qty: 30 TABLET | Refills: 1 | Status: SHIPPED | OUTPATIENT
Start: 2025-03-12

## 2025-03-12 ASSESSMENT — ANXIETY QUESTIONNAIRES
5. BEING SO RESTLESS THAT IT IS HARD TO SIT STILL: SEVERAL DAYS
GAD7 TOTAL SCORE: 12
7. FEELING AFRAID AS IF SOMETHING AWFUL MIGHT HAPPEN: MORE THAN HALF THE DAYS
2. NOT BEING ABLE TO STOP OR CONTROL WORRYING: MORE THAN HALF THE DAYS
GAD7 TOTAL SCORE: 12
6. BECOMING EASILY ANNOYED OR IRRITABLE: SEVERAL DAYS
3. WORRYING TOO MUCH ABOUT DIFFERENT THINGS: MORE THAN HALF THE DAYS
IF YOU CHECKED OFF ANY PROBLEMS ON THIS QUESTIONNAIRE, HOW DIFFICULT HAVE THESE PROBLEMS MADE IT FOR YOU TO DO YOUR WORK, TAKE CARE OF THINGS AT HOME, OR GET ALONG WITH OTHER PEOPLE: SOMEWHAT DIFFICULT
1. FEELING NERVOUS, ANXIOUS, OR ON EDGE: NEARLY EVERY DAY

## 2025-03-12 ASSESSMENT — PATIENT HEALTH QUESTIONNAIRE - PHQ9
5. POOR APPETITE OR OVEREATING: SEVERAL DAYS
SUM OF ALL RESPONSES TO PHQ QUESTIONS 1-9: 4

## 2025-03-12 NOTE — TELEPHONE ENCOUNTER
Reason for visit: Return patient     Relevant information: elevated PSA ,    Records/imaging/labs/orders: PSA done on 3/3/25    At Rooming:  Standard rooming      Omid Lopez  3/12/2025  2:46 PM

## 2025-03-12 NOTE — PATIENT INSTRUCTIONS
MRI to evaluate for acoustic neuroma, before May  Hold famotidine for ~2 weeks.  Take OTC omeprazole (Prilosec) 20 mg once daily for 2 weeks.  Then you can resume the famotidine.  TUMS is fine.  Please look up your previous therapist and consider seeing her again.  Start taking sertraline 25 mg once daily for anxiety.  Let's check in in ~4 weeks.

## 2025-03-12 NOTE — NURSING NOTE
Josep  69 year old    Chief Complaint   Patient presents with    Follow Up     From audiology appointment on 3/6/25            Blood pressure 123/62, pulse 60, temperature 98.1  F (36.7  C), temperature source Skin, resp. rate 14, weight 73 kg (161 lb), SpO2 97%. Body mass index is 24.12 kg/m .    Patient Active Problem List   Diagnosis    Hyperlipidemia LDL goal <100    Esophageal reflux    Post-nasal drainage    Ganglion cyst of left foot    Fear of public speaking    Adjustment insomnia    Medicare annual wellness visit, subsequent    Elevated prostate specific antigen (PSA)    Chronic right-sided low back pain with right-sided sciatica    Performance anxiety    Strain of right calf muscle    Cognitive change              Wt Readings from Last 2 Encounters:   03/12/25 73 kg (161 lb)   12/16/24 72.1 kg (159 lb)       BP Readings from Last 3 Encounters:   03/12/25 123/62   12/16/24 (!) 164/94   08/27/24 108/60                Current Outpatient Medications   Medication Sig Dispense Refill    atorvastatin (LIPITOR) 20 MG tablet Take 1 tablet (20 mg) by mouth daily. 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 90 tablet 4    TURMERIC PO Take by mouth.       No current facility-administered medications for this visit.              Social History     Tobacco Use    Smoking status: Never    Smokeless tobacco: Never    Tobacco comments:     few cigarettes in high school (less than 10)   Substance Use Topics    Alcohol use: Yes     Alcohol/week: 2.0 standard drinks of alcohol     Types: 2 Standard drinks or equivalent per week    Drug use: No              Health Maintenance Due   Topic Date Due    ADVANCE CARE PLANNING  Never done         "    No results found for: \"PAP\"           March 12, 2025 8:51 AM    "

## 2025-03-17 NOTE — PROGRESS NOTES
Subjective     REASON FOR VISIT  Elevated PSA follow-up    HISTORY OF PRESENT ILLNESS  Mr. Torres is a pleasant 69 year old male when speaking with today in regards to his history of elevated PSA status post prostate biopsy in 2022 showing no evidence of cancer but some evidence of chronic inflammation.  This biopsy was completed despite having a prostate MRI earlier in 2022 showing a 52 g gland and PI-RADS 2 changes given consistently elevated PSA.  Luckily, as of 2024, his PSA has returned closer to his baseline from 2019, so plan was to follow-up 1 year later.    Today:  No new questions, comments, or concerns    Objective     PHYSICAL EXAMINATION  General: Alert, oriented, no acute distress    LABS  Lab Results   Component Value Date    PSA 5.95 03/03/2025    PSA 6.60 (H) 12/16/2024    PSA 5.89 (H) 03/18/2024    PSA 7.86 (H) 03/20/2023    PSA 7.14 (H) 08/01/2022    PSA 8.56 (H) 04/26/2022    PSA 6.18 (H) 11/16/2021    PSA 4.97 (H) 09/17/2020      TRUS Biopsy 9/22/2022:  Final Diagnosis   A: Prostate, left base, needle core biopsy:  - Negative for malignancy.     B: Prostate, left mid, needle core biopsy:  - Negative for malignancy.     C: Prostate, left apex, needle core biopsy:  - Negative for malignancy.     D: Prostate, right base, needle core biopsy:  - Negative for malignancy.  - Acute and chronic inflammation present.     E: Prostate, right mid, needle core biopsy:  - Negative for malignancy.     F: Prostate, right apex, needle core biopsy:  - Negative for malignancy.  - Acute and chronic inflammation present.           IMAGING  Most recent prostate MRI from 5/26/2022 showing a 52 g gland and PI-RADS 2 changes    Narrative & Impression   MRI PROSTATE: 5/26/2022 8:41 AM     CLINICAL HISTORY: PROSTATE SPECIFIC ANTIGEN INCREASE; Rising PSA  level; Elevated prostate specific antigen (PSA)     Most Recent PSA: 8.56 ug/L on 4/26/2022     Comparison: 12/14/2021.     TECHNIQUE:  The following sequences were  obtained: High-resolution axial  T2-weighted, coronal T2-weighted, 3D volumetric T2-weighted, axial  pre-contrast T1, axial diffusion-weighted, axial apparent diffusion  coefficient and axial dynamic contrast-enhanced T1. Postcontrast  images were evaluated on a separate workstation to evaluate dynamic  contrast enhancement. The technique of this exam is PI-RADS v2.1  compliant. Contrast dose: 7.5 cc Gadavist     FINDINGS:  Size: 52 grams  Hemorrhage: Absent  Peripheral zone: Heterogeneous on T2-weighted images. Regions of  mildly decreased signal on ADC or DWI which are best characterized as  PI-RADS 2 without highly suspicious lesion.  Transition zone: Enlarged with BPH changes. Transition zone nodules  which are circumscribed or mostly encapsulated without diffusion  restriction.  PI-RADS 2.  No highly suspicious nodules.     Neurovascular bundles: No neurovascular bundle involvement by  malignancy.  Seminal vesicles: No seminal vesicle involvement by malignancy. The  left seminal vesicles demonstrate T1 hyperintensity and T2  hypointensity suggesting hemorrhagic or proteinaceous contents,  similar to the prior study.  Lymph nodes: No lymph node involvement  Bones: No suspicious lesions  Other pelvic organs: Scattered colonic diverticula without evidence of  acute diverticulitis.                                                                         IMPRESSION:  1. Based on the most suspicious abnormality, this exam is  characterized as PIRADS 2 - Clinically significant cancer is unlikely  to be present.    2. No suspicious adenopathy or evidence of pelvic metastases.     Assessment & Plan    Elevated PSA     It was my pleasure to meet with Josep in follow-up for his history of elevated PSA.  After reviewing his clinical history, I was happy to let him know that his most recent PSA is very stable, meaning I have no recommendations outside of a repeat PSA in 1 year.  It does appear that he had a PSA drawn both in  December with his primary and March from us, so we can continue to have him just get his PSAs with his primary then he can meet with us afterwards to discuss the results.    We did discuss the rationale behind discontinuing prostate cancer screening generally between the ages of 70-75, as well as how we can continue to screen him in the future.  Josep feels comfortable evaluating his own PSA after getting it done with his primary, and also clarified what next steps would be if it came back further elevated.  He would prefer to get a follow-up visit with me scheduled for February 2026, then potentially cancel it if everything looks good or this gives us time to get a repeat PSA prior to a follow-up visit with me.    Mr. Torres expressed understanding and agreement to the above discussion and plan and all of his questions were answered to his satisfaction.     PLAN  Follow-up office visit with me in February 2026    SIGNED:    Robert Eric PA-C    I spent a total of 22 minutes spent on the date of the encounter doing chart review, history and exam, documentation, and further activities as noted above.    none

## 2025-03-19 ENCOUNTER — OFFICE VISIT (OUTPATIENT)
Dept: UROLOGY | Facility: CLINIC | Age: 70
End: 2025-03-19
Payer: COMMERCIAL

## 2025-03-19 VITALS
HEART RATE: 55 BPM | DIASTOLIC BLOOD PRESSURE: 82 MMHG | SYSTOLIC BLOOD PRESSURE: 146 MMHG | WEIGHT: 161.8 LBS | BODY MASS INDEX: 24.24 KG/M2 | OXYGEN SATURATION: 98 %

## 2025-03-19 DIAGNOSIS — R97.20 ELEVATED PROSTATE SPECIFIC ANTIGEN (PSA): Primary | ICD-10-CM

## 2025-03-19 ASSESSMENT — PAIN SCALES - GENERAL: PAINLEVEL_OUTOF10: NO PAIN (0)

## 2025-03-19 NOTE — LETTER
3/19/2025       RE: Donald Torres  [address] [place] [id number]     Dear Colleague,    Thank you for referring your patient, Donald Torres, to the [facility] [place] at [facility]. Please see a copy of my visit note below.    Subjective    REASON FOR VISIT  Elevated PSA follow-up    HISTORY OF PRESENT ILLNESS  Mr. Torres is a pleasant 69 year old male when speaking with today in regards to his history of elevated PSA status post prostate biopsy in 2022 showing no evidence of cancer but some evidence of chronic inflammation.  This biopsy was completed despite having a prostate MRI earlier in 2022 showing a 52 g gland and PI-RADS 2 changes given consistently elevated PSA.  Luckily, as of 2024, his PSA has returned closer to his baseline from 2019, so plan was to follow-up 1 year later.    Today:  No new questions, comments, or concerns    Objective    PHYSICAL EXAMINATION  General: Alert, oriented, no acute distress    LABS  Lab Results   Component Value Date    PSA 5.95 03/03/2025    PSA 6.60 (H) 12/16/2024    PSA 5.89 (H) 03/18/2024    PSA 7.86 (H) 03/20/2023    PSA 7.14 (H) 08/01/2022    PSA 8.56 (H) 04/26/2022    PSA 6.18 (H) 11/16/2021    PSA 4.97 (H) 09/17/2020      TRUS Biopsy 9/22/2022:  Final Diagnosis   A: Prostate, left base, needle core biopsy:  - Negative for malignancy.     B: Prostate, left mid, needle core biopsy:  - Negative for malignancy.     C: Prostate, left apex, needle core biopsy:  - Negative for malignancy.     D: Prostate, right base, needle core biopsy:  - Negative for malignancy.  - Acute and chronic inflammation present.     E: Prostate, right mid, needle core biopsy:  - Negative for malignancy.     F: Prostate, right apex, needle core biopsy:  - Negative for malignancy.  - Acute and chronic inflammation present.           IMAGING  Most recent prostate MRI from 5/26/2022 showing a 52 g gland and  PI-RADS 2 changes    Narrative & Impression   MRI PROSTATE: 5/26/2022 8:41 AM     CLINICAL HISTORY: PROSTATE SPECIFIC ANTIGEN INCREASE; Rising PSA  level; Elevated prostate specific antigen (PSA)     Most Recent PSA: 8.56 ug/L on 4/26/2022     Comparison: 12/14/2021.     TECHNIQUE:  The following sequences were obtained: High-resolution axial  T2-weighted, coronal T2-weighted, 3D volumetric T2-weighted, axial  pre-contrast T1, axial diffusion-weighted, axial apparent diffusion  coefficient and axial dynamic contrast-enhanced T1. Postcontrast  images were evaluated on a separate workstation to evaluate dynamic  contrast enhancement. The technique of this exam is PI-RADS v2.1  compliant. Contrast dose: 7.5 cc Gadavist     FINDINGS:  Size: 52 grams  Hemorrhage: Absent  Peripheral zone: Heterogeneous on T2-weighted images. Regions of  mildly decreased signal on ADC or DWI which are best characterized as  PI-RADS 2 without highly suspicious lesion.  Transition zone: Enlarged with BPH changes. Transition zone nodules  which are circumscribed or mostly encapsulated without diffusion  restriction.  PI-RADS 2.  No highly suspicious nodules.     Neurovascular bundles: No neurovascular bundle involvement by  malignancy.  Seminal vesicles: No seminal vesicle involvement by malignancy. The  left seminal vesicles demonstrate T1 hyperintensity and T2  hypointensity suggesting hemorrhagic or proteinaceous contents,  similar to the prior study.  Lymph nodes: No lymph node involvement  Bones: No suspicious lesions  Other pelvic organs: Scattered colonic diverticula without evidence of  acute diverticulitis.                                                                         IMPRESSION:  1. Based on the most suspicious abnormality, this exam is  characterized as PIRADS 2 - Clinically significant cancer is unlikely  to be present.    2. No suspicious adenopathy or evidence of pelvic metastases.     Assessment & Plan   Elevated PSA      It was my pleasure to meet with Josep in follow-up for his history of elevated PSA.  After reviewing his clinical history, I was happy to let him know that his most recent PSA is very stable, meaning I have no recommendations outside of a repeat PSA in 1 year.  It does appear that he had a PSA drawn both in December with his primary and March from us, so we can continue to have him just get his PSAs with his primary then he can meet with us afterwards to discuss the results.    We did discuss the rationale behind discontinuing prostate cancer screening generally between the ages of 70-75, as well as how we can continue to screen him in the future.  Josep feels comfortable evaluating his own PSA after getting it done with his primary, and also clarified what next steps would be if it came back further elevated.  He would prefer to get a follow-up visit with me scheduled for February 2026, then potentially cancel it if everything looks good or this gives us time to get a repeat PSA prior to a follow-up visit with me.    Mr. Torres expressed understanding and agreement to the above discussion and plan and all of his questions were answered to his satisfaction.     PLAN  Follow-up office visit with me in February 2026    SIGNED:    Robert Eric PA-C    I spent a total of 22 minutes spent on the date of the encounter doing chart review, history and exam, documentation, and further activities as noted above.       Again, thank you for allowing me to participate in the care of your patient.      Sincerely,    Robert Eric PA-C

## 2025-03-19 NOTE — NURSING NOTE
Chief Complaint   Patient presents with    PSA follow up      No new concerns, recent fevers, or infections.      Patient is here today with his wife.     Blood pressure today is elevated. Patient attributes this to stress of coming into the clinic today.     Patient denies chest pain, headache, shortness of breath, and vision changes.  Patient is not concerned for elevated reading today.     Blood pressure (!) 146/82, pulse 55, weight 73.4 kg (161 lb 12.8 oz), SpO2 98%. Body mass index is 24.24 kg/m .    Patient Active Problem List   Diagnosis    Hyperlipidemia LDL goal <100    Esophageal reflux    Post-nasal drainage    Ganglion cyst of left foot    Fear of public speaking    Adjustment insomnia    Medicare annual wellness visit, subsequent    Elevated prostate specific antigen (PSA)    Chronic right-sided low back pain with right-sided sciatica    Performance anxiety    Strain of right calf muscle    Cognitive change       No Known Allergies    Current Outpatient Medications   Medication Sig Dispense Refill    atorvastatin (LIPITOR) 20 MG tablet Take 1 tablet (20 mg) by mouth daily. 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 30 tablet 1    traZODone (DESYREL) 50 MG tablet Take 1 tablet (50 mg) by mouth at bedtime. 90 tablet 4    TURMERIC PO Take by mouth.         Social History     Tobacco Use    Smoking status: Never    Smokeless tobacco: Never    Tobacco comments:     few cigarettes in high school (less than 10)   Substance Use Topics    Alcohol use: Yes     Alcohol/week: 2.0 standard drinks of alcohol     Types: 2 Standard drinks or equivalent per week    Drug use: No       Priyanka Lara  3/19/2025  9:40 AM

## 2025-03-22 NOTE — PROGRESS NOTES
Chief Complaint: Follow-up regarding hearing loss and possible cognitive changes    HPI: Josep is a 69-year-old accompanied by his wife, Leslie, to today's visit.    He had seen me in February and expressed some concerns about possible memory loss.  In that context, and given some hearing concerns, I recommended that he be evaluated.  He did that on March 6, 2025.  Here is a copy of the note from audiology:    Hx: Difficulty hearing in restaurants and hearing his wife when she isn't facing him. Denies tinnitus, dizziness, ear pain, drainage from ears, aural fullness, or hx of ear surgery. 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 Now uses table saw and uses hearing protection consistently. Results: Right normal/borderline normal hearing through 4000 Hz, sloping to a mild/moderate presumed sensorineural hearing loss at 7449-5698 Hz. Left normal/borderline normal hearing. Right ear is 25-35 dB poorer than left at 5377-4687 Hz. Bela negative Tympanograms normal in both ears. Acoustic reflexes present at normal levels in all conditions. Recs: ENT consult due to asymmetrical hearing loss. Retest at intervals advised by ENT. Provided handout on communication strategies. Continued consistent use of hearing protection. Follow up with PCP    Given his asymmetrical hearing loss, I will certainly go ahead and take care of the MRI of the brain with a specific focus on his auditory nerves, looking for acoustic neuroma on the right.  He already has an appointment scheduled to see ENT in May.  Therefore, it would be a great idea to get this taken care of before that visit.  If nothing is found, he may cancel that appointment.    He continues to struggle at times with his memory.  He and Leslie both agree that it might likely be due to inattention.  He has had absolutely no problems at all with driving.  He plays games with his kids and  there is been no gaps or confusion there.  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  There has never been an issue for any of the attendees wondering about his cognition.      Josep also acknowledges that anxiety might be a bit of an issue.  His sleep is been disrupted.  When he is more anxious, he gets more reflux.  Occasionally, he will wake secondary to an acid like sensation in his stomach.  He takes famotidine/Pepcid 20 mg and that seems to help.    Josep acknowledges that they have been quite anxious about their finances and they worry about their kids, as well as grandkids.    In the past, Donald therapist and that really helped.  He did that for about a year.  He is open to seeing a therapist again.    He does take trazodone 50 mg at night and that seems to help.    Active medical problems: Reviewed    Current medications: Reviewed    Objective:    Josep is in no distress.  Affect upbeat.  Vital signs are completely stable.  He is alert and oriented x 3.  He is somewhat soft-spoken in terms of vocal volume.    We reviewed his hearing test.  We had a good conversation about a lot of his stresses.      Assessment/Plan:    Josep is a 69-year-old who is still concerned a bit about his memory.  However, it sounds like inattention and anxiety may be playing a big role.  He does have some asymmetric hearing loss.    MRI of the brain with attention to the acoustic nerves has been ordered.  If anything shows up, he will keep his ENT appointment as scheduled in May.  If everything looks fine, he will likely cancel that.    We can go ahead and have him try taking sertraline 25 mg once daily.  Will see if this decreases his anxiety and helps with attention.  He might help his sleep indirectly.  He can check in with me in 2 to 4 weeks.  Will see how he is doing with that.      Total time spent with Josep and Leslie in history gathering, chart review, care coordination and counseling was 32 minutes    The  longitudinal plan of care for the diagnosis(es)/condition(s) as documented were addressed during this visit. Due to the added complexity in care, I will continue to support Josep in the subsequent management and with ongoing continuity of care.

## 2025-04-01 ENCOUNTER — PATIENT OUTREACH (OUTPATIENT)
Dept: GASTROENTEROLOGY | Facility: CLINIC | Age: 70
End: 2025-04-01
Payer: COMMERCIAL

## 2025-04-14 ENCOUNTER — ANCILLARY PROCEDURE (OUTPATIENT)
Dept: MRI IMAGING | Facility: CLINIC | Age: 70
End: 2025-04-14
Attending: FAMILY MEDICINE
Payer: COMMERCIAL

## 2025-04-14 DIAGNOSIS — H91.8X3 ASYMMETRICAL HEARING LOSS: ICD-10-CM

## 2025-04-14 PROCEDURE — 70553 MRI BRAIN STEM W/O & W/DYE: CPT | Performed by: RADIOLOGY

## 2025-04-14 PROCEDURE — A9585 GADOBUTROL INJECTION: HCPCS | Mod: JZ | Performed by: RADIOLOGY

## 2025-04-14 RX ORDER — GADOBUTROL 604.72 MG/ML
7.5 INJECTION INTRAVENOUS ONCE
Status: COMPLETED | OUTPATIENT
Start: 2025-04-14 | End: 2025-04-14

## 2025-04-14 RX ADMIN — GADOBUTROL 7.5 ML: 604.72 INJECTION INTRAVENOUS at 07:18

## 2025-04-29 ENCOUNTER — MYC MEDICAL ADVICE (OUTPATIENT)
Dept: FAMILY MEDICINE | Facility: CLINIC | Age: 70
End: 2025-04-29

## 2025-04-29 DIAGNOSIS — F41.1 GAD (GENERALIZED ANXIETY DISORDER): ICD-10-CM

## 2025-04-29 NOTE — TELEPHONE ENCOUNTER
Per pt My Chart discussion with Dr. Wilkinson on 4/9/2025, sending new Rx for Sertraline 50 MG to [facility] on [address].    MEEK AlegriaN, RN  04/29/25, 9:02 AM

## (undated) DEVICE — ENDO BIOVAC DIRECT SUCTION DEVICE W/Y PORT 00711513

## (undated) DEVICE — KIT ENDO TURNOVER/PROCEDURE CARRY-ON 101822

## (undated) DEVICE — SUCTION MANIFOLD NEPTUNE 2 SYS 1 PORT 702-025-000

## (undated) DEVICE — SOL WATER IRRIG 1000ML BOTTLE 2F7114

## (undated) DEVICE — SOL WATER IRRIG 500ML BOTTLE 2F7113

## (undated) DEVICE — TUBING SUCTION 12"X1/4" N612

## (undated) DEVICE — ENDO TRAP POLYP E-TRAP 00711099

## (undated) DEVICE — GOWN IMPERVIOUS 2XL BLUE

## (undated) DEVICE — TUBING SUCTION MEDI-VAC 1/4"X20' N620A

## (undated) DEVICE — SPECIMEN CONTAINER 3OZ W/FORMALIN 59901

## (undated) DEVICE — FCP BIOPSY RADIAL JAW 4 JUMBO 3.2MM CHANNEL M00513370

## (undated) DEVICE — ENDO SNARE EXACTO COLD 9MM LOOP 2.4MMX230CM 00711115

## (undated) DEVICE — ENDO FORCEP SPIKED SERRATED SHAFT JUMBO 239CM G56998

## (undated) RX ORDER — DIPHENHYDRAMINE HYDROCHLORIDE 50 MG/ML
INJECTION INTRAMUSCULAR; INTRAVENOUS
Status: DISPENSED
Start: 2020-12-18

## (undated) RX ORDER — FENTANYL CITRATE 50 UG/ML
INJECTION, SOLUTION INTRAMUSCULAR; INTRAVENOUS
Status: DISPENSED
Start: 2024-08-27

## (undated) RX ORDER — ONDANSETRON 2 MG/ML
INJECTION INTRAMUSCULAR; INTRAVENOUS
Status: DISPENSED
Start: 2020-12-18

## (undated) RX ORDER — FENTANYL CITRATE 50 UG/ML
INJECTION, SOLUTION INTRAMUSCULAR; INTRAVENOUS
Status: DISPENSED
Start: 2020-12-18